# Patient Record
Sex: FEMALE | Race: WHITE | NOT HISPANIC OR LATINO | Employment: FULL TIME | URBAN - METROPOLITAN AREA
[De-identification: names, ages, dates, MRNs, and addresses within clinical notes are randomized per-mention and may not be internally consistent; named-entity substitution may affect disease eponyms.]

---

## 2017-12-08 ENCOUNTER — HOSPITAL ENCOUNTER (OUTPATIENT)
Dept: FAMILY MEDICINE CLINIC | Facility: CLINIC | Age: 52
Setting detail: SPECIMEN
Discharge: HOME | End: 2017-12-08
Attending: FAMILY MEDICINE | Admitting: FAMILY MEDICINE

## 2017-12-08 LAB
ALBUMIN SERPL-MCNC: 4.2 G/DL (ref 3.5–4.8)
ALBUMIN/GLOB SERPL: 1.4 {RATIO} (ref 1–1.7)
ALP SERPL-CCNC: 47 IU/L (ref 32–91)
ALT SERPL-CCNC: 41 IU/L (ref 14–54)
ANION GAP SERPL CALC-SCNC: 12.7 MMOL/L (ref 10–20)
AST SERPL-CCNC: 32 IU/L (ref 15–41)
BILIRUB SERPL-MCNC: 0.3 MG/DL (ref 0.3–1.2)
BUN SERPL-MCNC: 13 MG/DL (ref 8–20)
BUN/CREAT SERPL: 18.6 (ref 5.4–26.2)
CALCIUM SERPL-MCNC: 9.6 MG/DL (ref 8.9–10.3)
CHLORIDE SERPL-SCNC: 104 MMOL/L (ref 101–111)
CHOLEST SERPL-MCNC: 182 MG/DL
CHOLEST/HDLC SERPL: 4.1 {RATIO}
CONV CO2: 27 MMOL/L (ref 22–32)
CONV LDL CHOLESTEROL DIRECT: 114 MG/DL (ref 0–100)
CONV TOTAL PROTEIN: 7.3 G/DL (ref 6.1–7.9)
CREAT UR-MCNC: 0.7 MG/DL (ref 0.4–1)
GLOBULIN UR ELPH-MCNC: 3.1 G/DL (ref 2.5–3.8)
GLUCOSE SERPL-MCNC: 94 MG/DL (ref 65–99)
HDLC SERPL-MCNC: 45 MG/DL
LDLC/HDLC SERPL: 2.6 {RATIO}
LIPID INTERPRETATION: ABNORMAL
POTASSIUM SERPL-SCNC: 4.7 MMOL/L (ref 3.6–5.1)
SODIUM SERPL-SCNC: 139 MMOL/L (ref 136–144)
TRIGL SERPL-MCNC: 207 MG/DL
TSH SERPL-ACNC: 0.74 UIU/ML (ref 0.34–5.6)
VLDLC SERPL CALC-MCNC: 23.5 MG/DL

## 2017-12-26 ENCOUNTER — HOSPITAL ENCOUNTER (OUTPATIENT)
Dept: FAMILY MEDICINE CLINIC | Facility: CLINIC | Age: 52
Setting detail: SPECIMEN
Discharge: HOME | End: 2017-12-26
Attending: FAMILY MEDICINE | Admitting: FAMILY MEDICINE

## 2019-12-29 RX ORDER — LEVOTHYROXINE SODIUM 0.1 MG/1
TABLET ORAL
Qty: 90 TABLET | Refills: 0 | Status: SHIPPED | OUTPATIENT
Start: 2019-12-29 | End: 2020-03-29

## 2019-12-29 RX ORDER — LOVASTATIN 20 MG/1
TABLET ORAL
Qty: 90 TABLET | Refills: 0 | Status: SHIPPED | OUTPATIENT
Start: 2019-12-29 | End: 2020-03-29

## 2020-01-02 ENCOUNTER — TELEPHONE (OUTPATIENT)
Dept: FAMILY MEDICINE CLINIC | Facility: CLINIC | Age: 55
End: 2020-01-02

## 2020-01-02 DIAGNOSIS — E78.5 HYPERLIPIDEMIA, UNSPECIFIED HYPERLIPIDEMIA TYPE: ICD-10-CM

## 2020-01-02 DIAGNOSIS — D50.8 OTHER IRON DEFICIENCY ANEMIA: Primary | ICD-10-CM

## 2020-01-02 DIAGNOSIS — E03.9 HYPOTHYROIDISM, UNSPECIFIED TYPE: ICD-10-CM

## 2020-01-02 NOTE — TELEPHONE ENCOUNTER
PT COMING IN Monday MORNING FOR LABS WITH F/U APPT LATER IN WEEK FOR MED REFILLS.  NEED ORDER PLEASE

## 2020-01-06 LAB
ALBUMIN SERPL-MCNC: 4.4 G/DL (ref 3.5–5.2)
ALBUMIN/GLOB SERPL: 1.4 G/DL
ALP SERPL-CCNC: 58 U/L (ref 39–117)
ALT SERPL W P-5'-P-CCNC: 54 U/L (ref 1–33)
ANION GAP SERPL CALCULATED.3IONS-SCNC: 15.3 MMOL/L (ref 5–15)
AST SERPL-CCNC: 47 U/L (ref 1–32)
BASOPHILS # BLD AUTO: 0.08 10*3/MM3 (ref 0–0.2)
BASOPHILS NFR BLD AUTO: 1.5 % (ref 0–1.5)
BILIRUB SERPL-MCNC: 0.4 MG/DL (ref 0.2–1.2)
BUN BLD-MCNC: 12 MG/DL (ref 6–20)
BUN/CREAT SERPL: 15.8 (ref 7–25)
CALCIUM SPEC-SCNC: 9.1 MG/DL (ref 8.6–10.5)
CHLORIDE SERPL-SCNC: 101 MMOL/L (ref 98–107)
CHOLEST SERPL-MCNC: 200 MG/DL (ref 0–200)
CO2 SERPL-SCNC: 22.7 MMOL/L (ref 22–29)
CREAT BLD-MCNC: 0.76 MG/DL (ref 0.57–1)
DEPRECATED RDW RBC AUTO: 40.3 FL (ref 37–54)
EOSINOPHIL # BLD AUTO: 0.27 10*3/MM3 (ref 0–0.4)
EOSINOPHIL NFR BLD AUTO: 5 % (ref 0.3–6.2)
ERYTHROCYTE [DISTWIDTH] IN BLOOD BY AUTOMATED COUNT: 12.8 % (ref 12.3–15.4)
GFR SERPL CREATININE-BSD FRML MDRD: 80 ML/MIN/1.73
GLOBULIN UR ELPH-MCNC: 3.2 GM/DL
GLUCOSE BLD-MCNC: 128 MG/DL (ref 65–99)
HCT VFR BLD AUTO: 37.4 % (ref 34–46.6)
HDLC SERPL-MCNC: 45 MG/DL (ref 40–60)
HGB BLD-MCNC: 12.5 G/DL (ref 12–15.9)
IMM GRANULOCYTES # BLD AUTO: 0.01 10*3/MM3 (ref 0–0.05)
IMM GRANULOCYTES NFR BLD AUTO: 0.2 % (ref 0–0.5)
LDLC SERPL CALC-MCNC: 109 MG/DL (ref 0–100)
LDLC/HDLC SERPL: 2.43 {RATIO}
LYMPHOCYTES # BLD AUTO: 2.01 10*3/MM3 (ref 0.7–3.1)
LYMPHOCYTES NFR BLD AUTO: 37.4 % (ref 19.6–45.3)
MCH RBC QN AUTO: 29.1 PG (ref 26.6–33)
MCHC RBC AUTO-ENTMCNC: 33.4 G/DL (ref 31.5–35.7)
MCV RBC AUTO: 87 FL (ref 79–97)
MONOCYTES # BLD AUTO: 0.38 10*3/MM3 (ref 0.1–0.9)
MONOCYTES NFR BLD AUTO: 7.1 % (ref 5–12)
NEUTROPHILS # BLD AUTO: 2.62 10*3/MM3 (ref 1.7–7)
NEUTROPHILS NFR BLD AUTO: 48.8 % (ref 42.7–76)
NRBC BLD AUTO-RTO: 0 /100 WBC (ref 0–0.2)
PLATELET # BLD AUTO: 305 10*3/MM3 (ref 140–450)
PMV BLD AUTO: 9.8 FL (ref 6–12)
POTASSIUM BLD-SCNC: 4.4 MMOL/L (ref 3.5–5.2)
PROT SERPL-MCNC: 7.6 G/DL (ref 6–8.5)
RBC # BLD AUTO: 4.3 10*6/MM3 (ref 3.77–5.28)
SODIUM BLD-SCNC: 139 MMOL/L (ref 136–145)
TRIGL SERPL-MCNC: 229 MG/DL (ref 0–150)
TSH SERPL DL<=0.05 MIU/L-ACNC: 3.33 UIU/ML (ref 0.27–4.2)
VLDLC SERPL-MCNC: 45.8 MG/DL (ref 5–40)
WBC NRBC COR # BLD: 5.37 10*3/MM3 (ref 3.4–10.8)

## 2020-01-06 PROCEDURE — 36415 COLL VENOUS BLD VENIPUNCTURE: CPT | Performed by: FAMILY MEDICINE

## 2020-01-06 PROCEDURE — 85025 COMPLETE CBC W/AUTO DIFF WBC: CPT | Performed by: FAMILY MEDICINE

## 2020-01-06 PROCEDURE — 80053 COMPREHEN METABOLIC PANEL: CPT | Performed by: FAMILY MEDICINE

## 2020-01-06 PROCEDURE — 84443 ASSAY THYROID STIM HORMONE: CPT | Performed by: FAMILY MEDICINE

## 2020-01-06 PROCEDURE — 80061 LIPID PANEL: CPT | Performed by: FAMILY MEDICINE

## 2020-01-10 ENCOUNTER — OFFICE VISIT (OUTPATIENT)
Dept: FAMILY MEDICINE CLINIC | Facility: CLINIC | Age: 55
End: 2020-01-10

## 2020-01-10 VITALS
TEMPERATURE: 98 F | WEIGHT: 259 LBS | HEART RATE: 79 BPM | HEIGHT: 67 IN | BODY MASS INDEX: 40.65 KG/M2 | SYSTOLIC BLOOD PRESSURE: 136 MMHG | OXYGEN SATURATION: 98 % | DIASTOLIC BLOOD PRESSURE: 90 MMHG

## 2020-01-10 DIAGNOSIS — E03.9 HYPOTHYROIDISM, UNSPECIFIED TYPE: Primary | ICD-10-CM

## 2020-01-10 DIAGNOSIS — E78.5 HYPERLIPIDEMIA, UNSPECIFIED HYPERLIPIDEMIA TYPE: ICD-10-CM

## 2020-01-10 DIAGNOSIS — R42 DIZZINESS: ICD-10-CM

## 2020-01-10 DIAGNOSIS — R00.2 PALPITATION: ICD-10-CM

## 2020-01-10 DIAGNOSIS — R73.03 PREDIABETES: ICD-10-CM

## 2020-01-10 PROCEDURE — 99214 OFFICE O/P EST MOD 30 MIN: CPT | Performed by: FAMILY MEDICINE

## 2020-01-10 NOTE — PROGRESS NOTES
Subjective   Chief Complaint   Patient presents with   • Med Refill   • Anxiety     thinking something bad is going to happen to her, has had some weird things going on with the her lately     Racheal Kate is a 54 y.o. female.     Anxiety   Presents for initial visit. Onset was 1 to 6 months ago. The problem has been gradually worsening. Symptoms include dizziness, irritability, nausea, nervous/anxious behavior, palpitations and shortness of breath. Patient reports no chest pain or depressed mood. Symptoms occur most days. The severity of symptoms is moderate. The quality of sleep is fair.     There are no known risk factors. Past treatments include nothing.   Hypothyroidism   This is a chronic problem. The current episode started more than 1 year ago. The problem has been unchanged. Associated symptoms include nausea. Pertinent negatives include no abdominal pain, chest pain, chills, coughing, fatigue, fever, rash, sore throat or weakness. Nothing aggravates the symptoms.   Hyperlipidemia   This is a chronic problem. The current episode started more than 1 year ago. Recent lipid tests were reviewed and are variable. Exacerbating diseases include hypothyroidism. There are no known factors aggravating her hyperlipidemia. Associated symptoms include shortness of breath. Pertinent negatives include no chest pain. Current antihyperlipidemic treatment includes statins. There are no compliance problems.       Past Medical History:   Diagnosis Date   • Anemia    • Bacterial vaginosis    • Cervical lymphadenopathy     RIGHT   • Head ache    • Hyperlipidemia    • Hypothyroidism    • Menometrorrhagia    • Menorrhagia      Past Surgical History:   Procedure Laterality Date   •  SECTION       Allergies   Allergen Reactions   • Iodine Unknown - High Severity   • Shellfish Allergy Unknown - High Severity     Social History     Socioeconomic History   • Marital status: Single     Spouse name: Not on file   • Number of  children: Not on file   • Years of education: Not on file   • Highest education level: Not on file   Tobacco Use   • Smoking status: Former Smoker     Years: 20.00   • Smokeless tobacco: Never Used   • Tobacco comment: quit 11 yrs ago   Substance and Sexual Activity   • Alcohol use: Not Currently     Comment: caffeine 1-3c qd     Social History     Tobacco Use   Smoking Status Former Smoker   • Years: 20.00   Smokeless Tobacco Never Used   Tobacco Comment    quit 11 yrs ago       family history includes Allergies in an other family member; COPD in her mother; Cancer in an other family member; Coronary artery disease in an other family member; Diabetes in her mother, sister, and another family member; Heart disease in her father, mother, and another family member.  Current Outpatient Medications on File Prior to Visit   Medication Sig Dispense Refill   • levothyroxine (SYNTHROID, LEVOTHROID) 100 MCG tablet TAKE 1 TABLET BY MOUTH ONCE DAILY 90 tablet 0   • lovastatin (MEVACOR) 20 MG tablet TAKE 1 TABLET BY MOUTH ONCE DAILY AT BEDTIME 90 tablet 0     No current facility-administered medications on file prior to visit.      Patient Active Problem List   Diagnosis   • Anemia   • Family history of diabetes mellitus   • Hyperlipidemia   • Hypothyroidism   • Prediabetes   • Palpitation   • Dizziness       The following portions of the patient's history were reviewed and updated as appropriate: allergies, current medications, past family history, past medical history, past social history, past surgical history and problem list.    Review of Systems   Constitutional: Positive for irritability. Negative for chills, fatigue and fever.   HENT: Negative for sinus pressure and sore throat.    Eyes: Negative for blurred vision.   Respiratory: Positive for shortness of breath. Negative for cough.    Cardiovascular: Positive for palpitations. Negative for chest pain.   Gastrointestinal: Positive for nausea. Negative for abdominal  "pain.   Endocrine: Negative for polyuria.   Musculoskeletal: Positive for back pain.   Skin: Negative for rash.   Neurological: Positive for dizziness and headache. Negative for weakness.   Hematological: Negative for adenopathy.   Psychiatric/Behavioral: Negative for depressed mood. The patient is nervous/anxious.        Objective   /90 (BP Location: Left arm, Patient Position: Sitting, Cuff Size: Large Adult)   Pulse 79   Temp 98 °F (36.7 °C) (Oral)   Ht 170.2 cm (67\")   Wt 117 kg (259 lb)   SpO2 98%   BMI 40.57 kg/m²   Physical Exam   Constitutional: She is oriented to person, place, and time. She appears well-developed. No distress.   HENT:   Head: Normocephalic.   Eyes: Conjunctivae and lids are normal.   Neck: Trachea normal. No thyroid mass and no thyromegaly present.   Cardiovascular: Normal rate, regular rhythm and normal heart sounds.   Pulmonary/Chest: Effort normal and breath sounds normal.   Lymphadenopathy:     She has no cervical adenopathy.   Neurological: She is alert and oriented to person, place, and time.   Skin: Skin is warm and dry.   Psychiatric: She has a normal mood and affect. Her speech is normal and behavior is normal. She is attentive.       No visits with results within 1 Week(s) from this visit.   Latest known visit with results is:   Telephone on 01/02/2020   Component Date Value Ref Range Status   • Glucose 01/06/2020 128* 65 - 99 mg/dL Final   • BUN 01/06/2020 12  6 - 20 mg/dL Final   • Creatinine 01/06/2020 0.76  0.57 - 1.00 mg/dL Final   • Sodium 01/06/2020 139  136 - 145 mmol/L Final   • Potassium 01/06/2020 4.4  3.5 - 5.2 mmol/L Final   • Chloride 01/06/2020 101  98 - 107 mmol/L Final   • CO2 01/06/2020 22.7  22.0 - 29.0 mmol/L Final   • Calcium 01/06/2020 9.1  8.6 - 10.5 mg/dL Final   • Total Protein 01/06/2020 7.6  6.0 - 8.5 g/dL Final   • Albumin 01/06/2020 4.40  3.50 - 5.20 g/dL Final   • ALT (SGPT) 01/06/2020 54* 1 - 33 U/L Final   • AST (SGOT) 01/06/2020 47* 1 " - 32 U/L Final   • Alkaline Phosphatase 01/06/2020 58  39 - 117 U/L Final   • Total Bilirubin 01/06/2020 0.4  0.2 - 1.2 mg/dL Final   • eGFR Non African Amer 01/06/2020 80  >60 mL/min/1.73 Final   • Globulin 01/06/2020 3.2  gm/dL Final   • A/G Ratio 01/06/2020 1.4  g/dL Final   • BUN/Creatinine Ratio 01/06/2020 15.8  7.0 - 25.0 Final   • Anion Gap 01/06/2020 15.3* 5.0 - 15.0 mmol/L Final   • Total Cholesterol 01/06/2020 200  0 - 200 mg/dL Final   • Triglycerides 01/06/2020 229* 0 - 150 mg/dL Final   • HDL Cholesterol 01/06/2020 45  40 - 60 mg/dL Final   • LDL Cholesterol  01/06/2020 109* 0 - 100 mg/dL Final   • VLDL Cholesterol 01/06/2020 45.8* 5 - 40 mg/dL Final   • LDL/HDL Ratio 01/06/2020 2.43   Final   • TSH 01/06/2020 3.330  0.270 - 4.200 uIU/mL Final   • WBC 01/06/2020 5.37  3.40 - 10.80 10*3/mm3 Final   • RBC 01/06/2020 4.30  3.77 - 5.28 10*6/mm3 Final   • Hemoglobin 01/06/2020 12.5  12.0 - 15.9 g/dL Final   • Hematocrit 01/06/2020 37.4  34.0 - 46.6 % Final   • MCV 01/06/2020 87.0  79.0 - 97.0 fL Final   • MCH 01/06/2020 29.1  26.6 - 33.0 pg Final   • MCHC 01/06/2020 33.4  31.5 - 35.7 g/dL Final   • RDW 01/06/2020 12.8  12.3 - 15.4 % Final   • RDW-SD 01/06/2020 40.3  37.0 - 54.0 fl Final   • MPV 01/06/2020 9.8  6.0 - 12.0 fL Final   • Platelets 01/06/2020 305  140 - 450 10*3/mm3 Final   • Neutrophil % 01/06/2020 48.8  42.7 - 76.0 % Final   • Lymphocyte % 01/06/2020 37.4  19.6 - 45.3 % Final   • Monocyte % 01/06/2020 7.1  5.0 - 12.0 % Final   • Eosinophil % 01/06/2020 5.0  0.3 - 6.2 % Final   • Basophil % 01/06/2020 1.5  0.0 - 1.5 % Final   • Immature Grans % 01/06/2020 0.2  0.0 - 0.5 % Final   • Neutrophils, Absolute 01/06/2020 2.62  1.70 - 7.00 10*3/mm3 Final   • Lymphocytes, Absolute 01/06/2020 2.01  0.70 - 3.10 10*3/mm3 Final   • Monocytes, Absolute 01/06/2020 0.38  0.10 - 0.90 10*3/mm3 Final   • Eosinophils, Absolute 01/06/2020 0.27  0.00 - 0.40 10*3/mm3 Final   • Basophils, Absolute 01/06/2020 0.08  0.00  - 0.20 10*3/mm3 Final   • Immature Grans, Absolute 01/06/2020 0.01  0.00 - 0.05 10*3/mm3 Final   • nRBC 01/06/2020 0.0  0.0 - 0.2 /100 WBC Final           Assessment/Plan   Problems Addressed this Visit        Cardiovascular and Mediastinum    Hyperlipidemia    Relevant Orders    Ambulatory Referral to Cardiology    Palpitation    Relevant Orders    Ambulatory Referral to Cardiology       Endocrine    Hypothyroidism - Primary       Other    Prediabetes    Dizziness    Relevant Orders    Ambulatory Referral to Cardiology          Racheal was seen today for med refill and anxiety.    Diagnoses and all orders for this visit:    Hypothyroidism, unspecified type    Prediabetes    Hyperlipidemia, unspecified hyperlipidemia type  -     Ambulatory Referral to Cardiology    Palpitation  -     Ambulatory Referral to Cardiology    Dizziness  -     Ambulatory Referral to Cardiology

## 2020-02-03 ENCOUNTER — OFFICE VISIT (OUTPATIENT)
Dept: CARDIOLOGY | Facility: CLINIC | Age: 55
End: 2020-02-03

## 2020-02-03 VITALS
DIASTOLIC BLOOD PRESSURE: 88 MMHG | SYSTOLIC BLOOD PRESSURE: 137 MMHG | HEIGHT: 67 IN | HEART RATE: 76 BPM | OXYGEN SATURATION: 98 % | WEIGHT: 268 LBS | BODY MASS INDEX: 42.06 KG/M2

## 2020-02-03 DIAGNOSIS — R73.03 PREDIABETES: ICD-10-CM

## 2020-02-03 DIAGNOSIS — E78.5 DYSLIPIDEMIA: ICD-10-CM

## 2020-02-03 DIAGNOSIS — R94.31 ABNORMAL EKG: ICD-10-CM

## 2020-02-03 DIAGNOSIS — Z82.49 FAMILY HISTORY OF PREMATURE CAD: ICD-10-CM

## 2020-02-03 DIAGNOSIS — I20.0 UNSTABLE ANGINA PECTORIS (HCC): Primary | ICD-10-CM

## 2020-02-03 PROCEDURE — 99204 OFFICE O/P NEW MOD 45 MIN: CPT | Performed by: INTERNAL MEDICINE

## 2020-02-03 PROCEDURE — 93000 ELECTROCARDIOGRAM COMPLETE: CPT | Performed by: INTERNAL MEDICINE

## 2020-02-03 RX ORDER — METOPROLOL SUCCINATE 25 MG/1
25 TABLET, EXTENDED RELEASE ORAL DAILY
Qty: 30 TABLET | Refills: 11 | Status: SHIPPED | OUTPATIENT
Start: 2020-02-03 | End: 2021-01-26

## 2020-02-03 NOTE — PROGRESS NOTES
Cardiology Consult Note    Patient Identification:  Name: Racheal Kate  Age: 54 y.o.  Sex: female  :  1965  MRN: [id number]             Requesting Physician : Dr. Kelly Casas    Reason for Consultation / Chief Complaint : patient co-management shortness of breath palpitations    History of Present Illness:      This is a 54-year-old with PMH of    -Dyslipidemia  -Prediabetes, hypothyroidism  -Anemia , anxiety, PCT, BMI over 40  -/Intolerance to iodine and shellfish  -Smoker  -For premature CAD Sister MI PCI 50 to mother  from MI Father had CAD CABG    Here for evaluation of dizziness shortness of breath and palpitations and chest pain.  Patient has intermittent dizziness and heart fluttering.  Has bilateral tightness in the shoulder blades lasted anywhere up to an hour a month ago.  Patient's arterial blood pressure is 137/88, heart rate 76, O2 sat of 98% on room air.  Labs from 2020 revealed normal CMP, CBC, TSH except for glucose of 128  Patient had an EKG done today with moderate ST segment depression nonspecific       Assessment:       -Prolonged new onset chest pain consistent with unstable angina  -Palpitations  -Dyslipidemia,   -positive family history,   -abnormal EKG,   -prediabetes    Recommendations / Plan:      Reviewed EKG results with patient  Counseled on diet exercise weight loss  Start beta-blockers help with palpitations and unstable angina  We will schedule for stress test patient says she cannot walk due to DJD and deconditioning will schedule a Lexiscan Cardiolite, risk benefits alternatives explained.  We will follow-up after testing and consider further evaluation and treatment          Diagnosis Plan   1. Unstable angina pectoris (CMS/HCC)  Stress Test With Myocardial Perfusion One Day   2. Abnormal EKG  Stress Test With Myocardial Perfusion One Day   3. Family history of premature CAD  Stress Test With Myocardial Perfusion One Day   4. Dyslipidemia  Stress Test With  Myocardial Perfusion One Day   5. Prediabetes              Past Medical History:  Past Medical History:   Diagnosis Date   • Anemia    • Bacterial vaginosis    • Cervical lymphadenopathy     RIGHT   • Head ache    • Hyperlipidemia    • Hypothyroidism    • Menometrorrhagia    • Menorrhagia      Past Surgical History:  Past Surgical History:   Procedure Laterality Date   •  SECTION        Allergies:  Allergies   Allergen Reactions   • Iodine Unknown - High Severity   • Shellfish Allergy Unknown - High Severity     Home Meds:  Current Meds:     Current Outpatient Medications:   •  aspirin 81 MG tablet, Take 1 tablet by mouth Daily., Disp: 30 tablet, Rfl: 11  •  levothyroxine (SYNTHROID, LEVOTHROID) 100 MCG tablet, TAKE 1 TABLET BY MOUTH ONCE DAILY, Disp: 90 tablet, Rfl: 0  •  lovastatin (MEVACOR) 20 MG tablet, TAKE 1 TABLET BY MOUTH ONCE DAILY AT BEDTIME, Disp: 90 tablet, Rfl: 0  •  metoprolol succinate XL (TOPROL-XL) 25 MG 24 hr tablet, Take 1 tablet by mouth Daily., Disp: 30 tablet, Rfl: 11  Social History:   Social History     Tobacco Use   • Smoking status: Former Smoker     Packs/day: 0.25     Years: 20.00     Pack years: 5.00     Types: Cigarettes   • Smokeless tobacco: Never Used   • Tobacco comment: quit 11 yrs ago   Substance Use Topics   • Alcohol use: Not Currently     Comment: caffeine 1-3c qd      Family History:  Family History   Problem Relation Age of Onset   • Coronary artery disease Other    • Diabetes Other         GRANDMOTHER   • Heart disease Other    • Allergies Other    • Cancer Other    • Diabetes Mother    • Heart disease Mother    • COPD Mother    • Diabetes Sister    • Heart disease Sister    • Heart disease Father    • Heart attack Father    • Heart attack Paternal Uncle    • Heart attack Paternal Uncle         Review of Systems : Review of Systems   Constitution: Negative for malaise/fatigue, weight gain and weight loss.   HENT: Negative for nosebleeds.    Cardiovascular: Positive  "for chest pain and palpitations. Negative for dyspnea on exertion, leg swelling, near-syncope and syncope.   Respiratory: Positive for shortness of breath (intermittent). Negative for cough and hemoptysis.    Endocrine: Negative for cold intolerance, heat intolerance, polydipsia and polyphagia.   Hematologic/Lymphatic: Does not bruise/bleed easily.   Skin: Negative for rash.   Musculoskeletal: Negative for arthritis and back pain.   Gastrointestinal: Negative for diarrhea, hematochezia, melena, nausea and vomiting.   Genitourinary: Negative for dysuria and hematuria.   Neurological: Positive for dizziness and light-headedness. Negative for numbness and seizures.   Psychiatric/Behavioral: Negative for altered mental status.       Constitutional:       Physical Exam   /88 (BP Location: Left arm, Patient Position: Sitting, Cuff Size: Large Adult)   Pulse 76   Ht 170.2 cm (67\")   Wt 122 kg (268 lb)   SpO2 98%   BMI 41.97 kg/m²   Physical Exam  General:  Appears in no acute distress  Eyes: Sclera is anicteric,  conjunctiva is clear   HEENT:  No JVD. Thyroid not visibly enlarged. No mucosal pallor or cyanosis  Respiratory: Respirations regular and unlabored at rest.  Bilaterally good breath sounds, with good air entry in all fields. No crackles, rubs or wheezes auscultated  Cardiovascular: S1,S2 Regular rate and rhythm. No murmur, rub or gallop auscultated. No pretibial pitting edema  Gastrointestinal: Abdomen soft, flat, non tender. Bowel sounds present.   Musculoskeletal:  No abnormal movements  Extremities: No digital clubbing or cyanosis  Skin: Color pink. Skin warm and dry to touch. No rashes  No xanthoma  Neuro: Alert and awake, no lateralizing deficits appreciated    Cardiographics  ECG: EKG tracing was  personally reviewed by me    ECG 12 Lead  Date/Time: 2/3/2020 1:31 PM  Performed by: Vipin Dumont MD  Authorized by: Vipin Dumont MD   Comparison: not compared with previous ECG "   Previous ECG: no previous ECG available  Comments: EKG done today reviewed by me shows sinus rhythm at the rate of 69 bpm with moderate ST segment depression, no comparison EKG available                   Imaging  Chest X-ray:   Imaging Results (Last 24 Hours)     ** No results found for the last 24 hours. **          Lab Review: I have reviewed the labs                                    Vipin Dumont MD  2/9/2020, 1:32 PM      EMR Dragon/Transcription:   Dictated utilizing Dragon dictation

## 2020-02-19 ENCOUNTER — TELEPHONE (OUTPATIENT)
Dept: CARDIOLOGY | Facility: HOSPITAL | Age: 55
End: 2020-02-19

## 2020-02-19 NOTE — TELEPHONE ENCOUNTER
PT WAS SCHEDULED FOR STRESS TEST Friday, HOWEVER SHE JUST CALLED TO CANCEL. SAID COLLECTIONS OFFICE CALLED HER AND INFORMED HER SHE WOULD HAVE TO PAY X AMOUNT UPFRONT TO HAVE THE TEST DONE. SHE EXPLAINED HER DEDUCTIBLE WAS HIGH SO HER OUT OF POCKET EXPENSE THEY ARE ASKING IS TO MUCH, PT PLANS TO SWITCH INSURANCE WHEN HER EMPLOYER HAS OPEN ENROLLMENT AND WILL TRY TO RESCHEDULE THEN IF IT MAKES UPFRONT PRICING CHEAPER

## 2020-02-21 ENCOUNTER — APPOINTMENT (OUTPATIENT)
Dept: CARDIOLOGY | Facility: HOSPITAL | Age: 55
End: 2020-02-21

## 2020-03-25 ENCOUNTER — TELEPHONE (OUTPATIENT)
Dept: FAMILY MEDICINE CLINIC | Facility: CLINIC | Age: 55
End: 2020-03-25

## 2020-03-25 RX ORDER — SULFAMETHOXAZOLE AND TRIMETHOPRIM 800; 160 MG/1; MG/1
1 TABLET ORAL 2 TIMES DAILY
Qty: 14 TABLET | Refills: 0 | Status: SHIPPED | OUTPATIENT
Start: 2020-03-25 | End: 2020-05-18

## 2020-03-29 RX ORDER — LOVASTATIN 20 MG/1
TABLET ORAL
Qty: 90 TABLET | Refills: 0 | Status: SHIPPED | OUTPATIENT
Start: 2020-03-29 | End: 2020-06-29 | Stop reason: SDUPTHER

## 2020-03-29 RX ORDER — LEVOTHYROXINE SODIUM 0.1 MG/1
TABLET ORAL
Qty: 90 TABLET | Refills: 0 | Status: SHIPPED | OUTPATIENT
Start: 2020-03-29 | End: 2020-06-29 | Stop reason: SDUPTHER

## 2020-05-18 ENCOUNTER — OFFICE VISIT (OUTPATIENT)
Dept: FAMILY MEDICINE CLINIC | Facility: CLINIC | Age: 55
End: 2020-05-18

## 2020-05-18 VITALS
SYSTOLIC BLOOD PRESSURE: 162 MMHG | BODY MASS INDEX: 39.78 KG/M2 | TEMPERATURE: 97.1 F | DIASTOLIC BLOOD PRESSURE: 94 MMHG | HEART RATE: 71 BPM | OXYGEN SATURATION: 98 % | WEIGHT: 254 LBS
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
## 2020-05-18 DIAGNOSIS — L03.311 CELLULITIS, ABDOMINAL WALL: Primary | ICD-10-CM

## 2020-05-18 DIAGNOSIS — S30.861A TICK BITE OF ABDOMINAL WALL, INITIAL ENCOUNTER: ICD-10-CM

## 2020-05-18 PROCEDURE — 99213 OFFICE O/P EST LOW 20 MIN: CPT | Performed by: FAMILY MEDICINE

## 2020-05-18 RX ORDER — DOXYCYCLINE 100 MG/1
100 CAPSULE ORAL 2 TIMES DAILY
Qty: 20 CAPSULE | Refills: 0 | Status: SHIPPED | OUTPATIENT
Start: 2020-05-18 | End: 2020-06-15

## 2020-05-18 NOTE — PATIENT INSTRUCTIONS
Tick Bite Information, Adult    Ticks are insects that can bite. Most ticks live in shrubs and grassy areas. They climb onto people and animals that go by. Then they bite. Some ticks carry germs that can make you sick.  How can I prevent tick bites?  · Use an insect repellent that has 20% or higher of the ingredients DEET, picaridin, or [id number]. Put this insect repellent on:  ? Bare skin.  ? The tops of your boots.  ? Your pant legs.  ? The ends of your sleeves.  · If you use an insect repellent that has the ingredient permethrin, make sure to follow the instructions on the bottle. Treat the following:  ? Clothing.  ? Supplies.  ? Boots.  ? Tents.  · Wear long sleeves, long pants, and light colors.  · Tuck your pant legs into your socks.  · Stay in the middle of the trail.  · Try not to walk through long grass.  · Before going inside your house, check your clothes, hair, and skin for ticks. Make sure to check your head, neck, armpits, waist, groin, and joint areas.  · Check for ticks every day.  · When you come indoors:  ? Wash your clothes right away.  ? Shower right away.  ? Dry your clothes in a dryer on high heat for 60 minutes or more.  What is the right way to remove a tick?  Remove a tick from your skin as soon as possible.  · To remove a tick that is crawling on your skin:  ? Go outdoors and brush the tick off.  ? Use tape or a lint roller.  · To remove a tick that is biting:  ? Wash your hands.  ? If you have latex gloves, put them on.  ? Use tweezers, curved forceps, or a tick-removal tool to grasp the tick. Grasp the tick as close to your skin and as close to the tick's head as possible.  ? Gently pull up until the tick lets go.  § Try to keep the tick's head attached to its body.  § Do not twist or jerk the tick.  § Do not squeeze or crush the tick.  Do not try to remove a tick with heat, alcohol, petroleum jelly, or fingernail polish.  How should I get rid of a tick?  Here are some ways to get rid of a  tick that is alive:  · Place the tick in rubbing alcohol.  · Place the tick in a bag or container you can close tightly.  · Wrap the tick tightly in tape.  · Flush the tick down the toilet.  Contact a doctor if:  · You have symptoms of a disease, such as:  ? Pain in a muscle, joint, or bone.  ? Trouble walking or moving your legs.  ? Numbness in your legs.  ? Inability to move (paralysis).  ? A red rash that makes a [place] (bull's-eye rash).  ? Redness and swelling where the tick bit you.  ? A fever.  ? Throwing up (vomiting) over and over.  ? Diarrhea.  ? Weight loss.  ? Tender and swollen lymph glands.  ? Shortness of breath.  ? Cough.  ? Belly pain (abdominal pain).  ? Headache.  ? Being more tired than normal.  ? A change in how alert (conscious) you are.  ? Confusion.  Get help right away if:  · You cannot remove a tick.  · A part of a tick breaks off and gets stuck in your skin.  · You are feeling worse.  Summary  · Ticks may carry germs that can make you sick.  · To prevent tick bites, wear long sleeves, long pants, and light colors. Use insect repellent. Follow the instructions on the bottle.  · If the tick is biting, do not try to remove it with heat, alcohol, petroleum jelly, or fingernail polish.  · Use tweezers, curved forceps, or a tick-removal tool to grasp the tick. Gently pull up until the tick lets go. Do not twist or jerk the tick. Do not squeeze or crush the tick.  · If you have symptoms, contact a doctor.  This information is not intended to replace advice given to you by your health care provider. Make sure you discuss any questions you have with your health care provider.  Document Released: 03/14/2011 Document Revised: 12/03/2019 Document Reviewed: 03/30/2018  [employer] Interactive Patient Education © 2020 [employer]

## 2020-05-18 NOTE — PROGRESS NOTES
Subjective   Chief Complaint   Patient presents with   • Insect Bite     tick bite yesterday     Racheal Kate is a 54 y.o. female.     Insect Bite   This is a new problem. The current episode started yesterday. The problem occurs constantly. The problem has been gradually worsening. Associated symptoms include a rash. Pertinent negatives include no abdominal pain, arthralgias, chest pain, chills, coughing, fever, headaches, myalgias, sore throat or swollen glands. Nothing aggravates the symptoms. She has tried nothing for the symptoms.      Past Medical History:   Diagnosis Date   • Anemia    • Bacterial vaginosis    • Cervical lymphadenopathy     RIGHT   • Head ache    • Hyperlipidemia    • Hypothyroidism    • Menometrorrhagia    • Menorrhagia      Past Surgical History:   Procedure Laterality Date   •  SECTION       Allergies   Allergen Reactions   • Iodine Unknown - High Severity   • Shellfish Allergy Unknown - High Severity     Social History     Socioeconomic History   • Marital status: Single     Spouse name: Not on file   • Number of children: Not on file   • Years of education: Not on file   • Highest education level: Not on file   Tobacco Use   • Smoking status: Former Smoker     Packs/day: 0.25     Years: 20.00     Pack years: 5.00     Types: Cigarettes   • Smokeless tobacco: Never Used   • Tobacco comment: quit 11 yrs ago   Substance and Sexual Activity   • Alcohol use: Not Currently     Comment: caffeine 1-3c qd     Social History     Tobacco Use   Smoking Status Former Smoker   • Packs/day: 0.25   • Years: 20.00   • Pack years: 5.00   • Types: Cigarettes   Smokeless Tobacco Never Used   Tobacco Comment    quit 11 yrs ago       family history includes Allergies in an other family member; COPD in her mother; Cancer in an other family member; Coronary artery disease in an other family member; Diabetes in her mother, sister, and another family member; Heart attack in her father, paternal uncle, and  paternal uncle; Heart disease in her father, mother, sister, and another family member.  Current Outpatient Medications on File Prior to Visit   Medication Sig Dispense Refill   • aspirin 81 MG tablet Take 1 tablet by mouth Daily. 30 tablet 11   • levothyroxine (SYNTHROID, LEVOTHROID) 100 MCG tablet Take 1 tablet by mouth once daily 90 tablet 0   • lovastatin (MEVACOR) 20 MG tablet TAKE 1 TABLET BY MOUTH ONCE DAILY AT BEDTIME 90 tablet 0   • metoprolol succinate XL (TOPROL-XL) 25 MG 24 hr tablet Take 1 tablet by mouth Daily. 30 tablet 11   • [DISCONTINUED] sulfamethoxazole-trimethoprim (Bactrim DS) 800-160 MG per tablet Take 1 tablet by mouth 2 (Two) Times a Day. 14 tablet 0     No current facility-administered medications on file prior to visit.      Patient Active Problem List   Diagnosis   • Anemia   • Family history of diabetes mellitus   • Hyperlipidemia   • Hypothyroidism   • Prediabetes   • Palpitation   • Dizziness   • Tick bite of abdominal wall   • Cellulitis, abdominal wall       The following portions of the patient's history were reviewed and updated as appropriate: allergies, current medications, past family history, past medical history, past social history, past surgical history and problem list.    Review of Systems   Constitutional: Negative for chills and fever.   HENT: Negative for sinus pressure, sore throat and swollen glands.    Eyes: Negative for blurred vision.   Respiratory: Negative for cough and shortness of breath.    Cardiovascular: Negative for chest pain and palpitations.   Gastrointestinal: Negative for abdominal pain.   Endocrine: Negative for polyuria.   Musculoskeletal: Negative for arthralgias and myalgias.   Skin: Positive for rash.   Neurological: Negative for dizziness and headache.   Hematological: Negative for adenopathy.   Psychiatric/Behavioral: Negative for depressed mood.       Objective   /94 (BP Location: Left arm, Patient Position: Sitting, Cuff Size: Adult)    Pulse 71   Temp 97.1 °F (36.2 °C) Comment: contactless  Wt 115 kg (254 lb)   SpO2 98%   BMI 39.78 kg/m²   Physical Exam   Constitutional: She is oriented to person, place, and time. She appears well-developed. No distress.   HENT:   Head: Normocephalic.   Eyes: Conjunctivae and lids are normal.   Neck: Trachea normal. No thyroid mass and no thyromegaly present.   Cardiovascular: Normal rate, regular rhythm and normal heart sounds.   Pulmonary/Chest: Effort normal and breath sounds normal.   Lymphadenopathy:     She has no cervical adenopathy.   Neurological: She is alert and oriented to person, place, and time.   Skin: Skin is warm and dry.   Red and white target lesion right lower abdomen, approx 3 cm    Psychiatric: She has a normal mood and affect. Her speech is normal and behavior is normal. She is attentive.       No visits with results within 1 Week(s) from this visit.   Latest known visit with results is:   Telephone on 01/02/2020   Component Date Value Ref Range Status   • Glucose 01/06/2020 128* 65 - 99 mg/dL Final   • BUN 01/06/2020 12  6 - 20 mg/dL Final   • Creatinine 01/06/2020 0.76  0.57 - 1.00 mg/dL Final   • Sodium 01/06/2020 139  136 - 145 mmol/L Final   • Potassium 01/06/2020 4.4  3.5 - 5.2 mmol/L Final   • Chloride 01/06/2020 101  98 - 107 mmol/L Final   • CO2 01/06/2020 22.7  22.0 - 29.0 mmol/L Final   • Calcium 01/06/2020 9.1  8.6 - 10.5 mg/dL Final   • Total Protein 01/06/2020 7.6  6.0 - 8.5 g/dL Final   • Albumin 01/06/2020 4.40  3.50 - 5.20 g/dL Final   • ALT (SGPT) 01/06/2020 54* 1 - 33 U/L Final   • AST (SGOT) 01/06/2020 47* 1 - 32 U/L Final   • Alkaline Phosphatase 01/06/2020 58  39 - 117 U/L Final   • Total Bilirubin 01/06/2020 0.4  0.2 - 1.2 mg/dL Final   • eGFR Non African Amer 01/06/2020 80  >60 mL/min/1.73 Final   • Globulin 01/06/2020 3.2  gm/dL Final   • A/G Ratio 01/06/2020 1.4  g/dL Final   • BUN/Creatinine Ratio 01/06/2020 15.8  7.0 - 25.0 Final   • Anion Gap 01/06/2020 15.3*  5.0 - 15.0 mmol/L Final   • Total Cholesterol 01/06/2020 200  0 - 200 mg/dL Final   • Triglycerides 01/06/2020 229* 0 - 150 mg/dL Final   • HDL Cholesterol 01/06/2020 45  40 - 60 mg/dL Final   • LDL Cholesterol  01/06/2020 109* 0 - 100 mg/dL Final   • VLDL Cholesterol 01/06/2020 45.8* 5 - 40 mg/dL Final   • LDL/HDL Ratio 01/06/2020 2.43   Final   • TSH 01/06/2020 3.330  0.270 - 4.200 uIU/mL Final   • WBC 01/06/2020 5.37  3.40 - 10.80 10*3/mm3 Final   • RBC 01/06/2020 4.30  3.77 - 5.28 10*6/mm3 Final   • Hemoglobin 01/06/2020 12.5  12.0 - 15.9 g/dL Final   • Hematocrit 01/06/2020 37.4  34.0 - 46.6 % Final   • MCV 01/06/2020 87.0  79.0 - 97.0 fL Final   • MCH 01/06/2020 29.1  26.6 - 33.0 pg Final   • MCHC 01/06/2020 33.4  31.5 - 35.7 g/dL Final   • RDW 01/06/2020 12.8  12.3 - 15.4 % Final   • RDW-SD 01/06/2020 40.3  37.0 - 54.0 fl Final   • MPV 01/06/2020 9.8  6.0 - 12.0 fL Final   • Platelets 01/06/2020 305  140 - 450 10*3/mm3 Final   • Neutrophil % 01/06/2020 48.8  42.7 - 76.0 % Final   • Lymphocyte % 01/06/2020 37.4  19.6 - 45.3 % Final   • Monocyte % 01/06/2020 7.1  5.0 - 12.0 % Final   • Eosinophil % 01/06/2020 5.0  0.3 - 6.2 % Final   • Basophil % 01/06/2020 1.5  0.0 - 1.5 % Final   • Immature Grans % 01/06/2020 0.2  0.0 - 0.5 % Final   • Neutrophils, Absolute 01/06/2020 2.62  1.70 - 7.00 10*3/mm3 Final   • Lymphocytes, Absolute 01/06/2020 2.01  0.70 - 3.10 10*3/mm3 Final   • Monocytes, Absolute 01/06/2020 0.38  0.10 - 0.90 10*3/mm3 Final   • Eosinophils, Absolute 01/06/2020 0.27  0.00 - 0.40 10*3/mm3 Final   • Basophils, Absolute 01/06/2020 0.08  0.00 - 0.20 10*3/mm3 Final   • Immature Grans, Absolute 01/06/2020 0.01  0.00 - 0.05 10*3/mm3 Final   • nRBC 01/06/2020 0.0  0.0 - 0.2 /100 WBC Final           Assessment/Plan   Problems Addressed this Visit        Musculoskeletal and Integument    Tick bite of abdominal wall    Relevant Medications    doxycycline (MONODOX) 100 MG capsule       Other    Cellulitis,  "abdominal wall - Primary    Relevant Medications    doxycycline (MONODOX) 100 MG capsule                 Answers for HPI/ROS submitted by the patient on 5/18/2020   What is the primary reason for your visit?: Other  Please describe your symptoms.: I have a \"ring\" around a recent tick bite.  Have you had these symptoms before?: No  How long have you been having these symptoms?: 1-4 days  Please describe any probable cause for these symptoms. : Tick bite    "

## 2020-05-20 ENCOUNTER — TELEPHONE (OUTPATIENT)
Dept: FAMILY MEDICINE CLINIC | Facility: CLINIC | Age: 55
End: 2020-05-20

## 2020-06-15 PROCEDURE — U0003 INFECTIOUS AGENT DETECTION BY NUCLEIC ACID (DNA OR RNA); SEVERE ACUTE RESPIRATORY SYNDROME CORONAVIRUS 2 (SARS-COV-2) (CORONAVIRUS DISEASE [COVID-19]), AMPLIFIED PROBE TECHNIQUE, MAKING USE OF HIGH THROUGHPUT TECHNOLOGIES AS DESCRIBED BY CMS-2020-01-R: HCPCS | Performed by: FAMILY MEDICINE

## 2020-06-26 RX ORDER — LOVASTATIN 20 MG/1
TABLET ORAL
Qty: 90 TABLET | Refills: 0 | OUTPATIENT
Start: 2020-06-26

## 2020-06-26 RX ORDER — LEVOTHYROXINE SODIUM 0.1 MG/1
TABLET ORAL
Qty: 90 TABLET | Refills: 0 | OUTPATIENT
Start: 2020-06-26

## 2020-06-29 RX ORDER — LOVASTATIN 20 MG/1
20 TABLET ORAL
Qty: 90 TABLET | Refills: 2 | Status: SHIPPED | OUTPATIENT
Start: 2020-06-29 | End: 2021-03-29

## 2020-06-29 RX ORDER — LEVOTHYROXINE SODIUM 0.1 MG/1
100 TABLET ORAL DAILY
Qty: 90 TABLET | Refills: 2 | Status: SHIPPED | OUTPATIENT
Start: 2020-06-29 | End: 2021-03-29

## 2020-07-06 ENCOUNTER — OFFICE VISIT (OUTPATIENT)
Dept: FAMILY MEDICINE CLINIC | Facility: CLINIC | Age: 55
End: 2020-07-06

## 2020-07-06 VITALS
OXYGEN SATURATION: 98 % | WEIGHT: 256 LBS | TEMPERATURE: 98 F | BODY MASS INDEX: 38.92 KG/M2 | HEART RATE: 71 BPM | SYSTOLIC BLOOD PRESSURE: 178 MMHG | DIASTOLIC BLOOD PRESSURE: 108 MMHG

## 2020-07-06 DIAGNOSIS — M54.31 SCIATICA OF RIGHT SIDE: Primary | ICD-10-CM

## 2020-07-06 PROCEDURE — 99213 OFFICE O/P EST LOW 20 MIN: CPT | Performed by: FAMILY MEDICINE

## 2020-07-06 RX ORDER — CYCLOBENZAPRINE HCL 10 MG
10 TABLET ORAL 3 TIMES DAILY PRN
Qty: 20 TABLET | Refills: 0 | Status: SHIPPED | OUTPATIENT
Start: 2020-07-06 | End: 2020-07-17

## 2020-07-06 RX ORDER — IBUPROFEN 200 MG
800 TABLET ORAL EVERY 6 HOURS PRN
COMMUNITY
End: 2021-04-07

## 2020-07-06 RX ORDER — ACETAMINOPHEN 500 MG
1000 TABLET ORAL EVERY 6 HOURS PRN
COMMUNITY
End: 2021-04-07

## 2020-07-06 RX ORDER — METHYLPREDNISOLONE 4 MG/1
TABLET ORAL
Qty: 1 EACH | Refills: 0 | Status: SHIPPED | OUTPATIENT
Start: 2020-07-06 | End: 2021-04-07

## 2020-07-06 NOTE — PATIENT INSTRUCTIONS
Sciatica    Sciatica is pain, weakness, tingling, or loss of feeling (numbness) along the sciatic nerve. The sciatic nerve starts in the lower back and goes down the back of each leg. Sciatica usually goes away on its own or with treatment. Sometimes, sciatica may come back (recur).  What are the causes?  This condition happens when the sciatic nerve is pinched or has pressure put on it. This may be the result of:  · A disk in between the bones of the spine bulging out too far (herniated disk).  · Changes in the spinal disks that occur with aging.  · A condition that affects a muscle in the butt.  · Extra bone growth near the sciatic nerve.  · A break (fracture) of the area between your hip bones (pelvis).  · Pregnancy.  · Tumor. This is rare.  What increases the risk?  You are more likely to develop this condition if you:  · Play sports that put pressure or stress on the spine.  · Have poor strength and ease of movement (flexibility).  · Have had a back injury in the past.  · Have had back surgery.  · Sit for long periods of time.  · Do activities that involve bending or lifting over and over again.  · Are very overweight (obese).  What are the signs or symptoms?  Symptoms can vary from mild to very bad. They may include:  · Any of these problems in the lower back, leg, hip, or butt:  ? Mild tingling, loss of feeling, or dull aches.  ? Burning sensations.  ? Sharp pains.  · Loss of feeling in the back of the calf or the sole of the foot.  · Leg weakness.  · Very bad back pain that makes it hard to move.  These symptoms may get worse when you cough, sneeze, or laugh. They may also get worse when you sit or stand for long periods of time.  How is this treated?  This condition often gets better without any treatment. However, treatment may include:  · Changing or cutting back on physical activity when you have pain.  · Doing exercises and stretching.  · Putting ice or heat on the affected area.  · Medicines that  help:  ? To relieve pain and swelling.  ? To relax your muscles.  · Shots (injections) of medicines that help to relieve pain, irritation, and swelling.  · Surgery.  Follow these instructions at home:  Medicines  · Take over-the-counter and prescription medicines only as told by your doctor.  · Ask your doctor if the medicine prescribed to you:  ? Requires you to avoid driving or using heavy machinery.  ? Can cause trouble pooping (constipation). You may need to take these steps to prevent or treat trouble pooping:  § Drink enough fluids to keep your pee (urine) pale yellow.  § Take over-the-counter or prescription medicines.  § Eat foods that are high in fiber. These include beans, whole grains, and fresh fruits and vegetables.  § Limit foods that are high in fat and sugar. These include fried or sweet foods.  Managing pain         · If told, put ice on the affected area.  ? Put ice in a plastic bag.  ? Place a towel between your skin and the bag.  ? Leave the ice on for 20 minutes, 2-3 times a day.  · If told, put heat on the affected area. Use the heat source that your doctor tells you to use, such as a moist heat pack or a heating pad.  ? Place a towel between your skin and the heat source.  ? Leave the heat on for 20-30 minutes.  ? Remove the heat if your skin turns bright red. This is very important if you are unable to feel pain, heat, or cold. You may have a greater risk of getting burned.  Activity    · Return to your normal activities as told by your doctor. Ask your doctor what activities are safe for you.  · Avoid activities that make your symptoms worse.  · Take short rests during the day.  ? When you rest for a long time, do some physical activity or stretching between periods of rest.  ? Avoid sitting for a long time without moving. Get up and move around at least one time each hour.  · Exercise and stretch regularly, as told by your doctor.  · Do not lift anything that is heavier than 10 lb (4.5 kg)  while you have symptoms of sciatica.  ? Avoid lifting heavy things even when you do not have symptoms.  ? Avoid lifting heavy things over and over.  · When you lift objects, always lift in a way that is safe for your body. To do this, you should:  ? Bend your knees.  ? Keep the object close to your body.  ? Avoid twisting.  General instructions  · Stay at a healthy weight.  · Wear comfortable shoes that support your feet. Avoid wearing high heels.  · Avoid sleeping on a mattress that is too soft or too hard. You might have less pain if you sleep on a mattress that is firm enough to support your back.  · Keep all follow-up visits as told by your doctor. This is important.  Contact a doctor if:  · You have pain that:  ? Wakes you up when you are sleeping.  ? Gets worse when you lie down.  ? Is worse than the pain you have had in the past.  ? Lasts longer than 4 weeks.  · You lose weight without trying.  Get help right away if:  · You cannot control when you pee (urinate) or poop (have a bowel movement).  · You have weakness in any of these areas and it gets worse:  ? Lower back.  ? The area between your hip bones.  ? Butt.  ? Legs.  · You have redness or swelling of your back.  · You have a burning feeling when you pee.  Summary  · Sciatica is pain, weakness, tingling, or loss of feeling (numbness) along the sciatic nerve.  · This condition happens when the sciatic nerve is pinched or has pressure put on it.  · Sciatica can cause pain, tingling, or loss of feeling (numbness) in the lower back, legs, hips, and butt.  · Treatment often includes rest, exercise, medicines, and putting ice or heat on the affected area.  This information is not intended to replace advice given to you by your health care provider. Make sure you discuss any questions you have with your health care provider.  Document Released: 09/26/2009 Document Revised: 01/06/2020 Document Reviewed: 01/06/2020  [employer] Patient Education © 2020 [employer]

## 2020-07-06 NOTE — PROGRESS NOTES
Subjective   Chief Complaint   Patient presents with   • Back Pain     radiates down back of her leg     Racheal Kate is a 54 y.o. female.     Back Pain   This is a new problem. The current episode started in the past 7 days. The problem occurs constantly. The problem has been gradually worsening since onset. The pain is present in the gluteal. The quality of the pain is described as aching and shooting. The pain radiates to the right foot. The pain is moderate. The symptoms are aggravated by sitting and position. Pertinent negatives include no abdominal pain, chest pain, dysuria, fever, headaches, pelvic pain, tingling or weakness. She has tried NSAIDs, analgesics, heat and walking for the symptoms. The treatment provided mild relief.      Past Medical History:   Diagnosis Date   • Anemia    • Bacterial vaginosis    • Cervical lymphadenopathy     RIGHT   • Head ache    • Hyperlipidemia    • Hypothyroidism    • Menometrorrhagia    • Menorrhagia      Past Surgical History:   Procedure Laterality Date   •  SECTION       Allergies   Allergen Reactions   • Iodine Unknown - High Severity   • Shellfish Allergy Unknown - High Severity     Social History     Socioeconomic History   • Marital status: Single     Spouse name: Not on file   • Number of children: Not on file   • Years of education: Not on file   • Highest education level: Not on file   Tobacco Use   • Smoking status: Former Smoker     Packs/day: 0.25     Years: 20.00     Pack years: 5.00     Types: Cigarettes   • Smokeless tobacco: Never Used   • Tobacco comment: quit 11 yrs ago   Substance and Sexual Activity   • Alcohol use: Not Currently     Comment: caffeine 1-3c qd   • Drug use: Defer   • Sexual activity: Defer     Social History     Tobacco Use   Smoking Status Former Smoker   • Packs/day: 0.25   • Years: 20.00   • Pack years: 5.00   • Types: Cigarettes   Smokeless Tobacco Never Used   Tobacco Comment    quit 11 yrs ago       family history  includes Allergies in an other family member; COPD in her mother; Cancer in an other family member; Coronary artery disease in an other family member; Diabetes in her mother, sister, and another family member; Heart attack in her father, paternal uncle, and paternal uncle; Heart disease in her father, mother, sister, and another family member.  Current Outpatient Medications on File Prior to Visit   Medication Sig Dispense Refill   • acetaminophen (TYLENOL) 500 MG tablet Take 1,000 mg by mouth Every 6 (Six) Hours As Needed for Mild Pain .     • ibuprofen (ADVIL,MOTRIN) 200 MG tablet Take 800 mg by mouth Every 6 (Six) Hours As Needed for Mild Pain  or Moderate Pain .     • aspirin 81 MG tablet Take 1 tablet by mouth Daily. 30 tablet 11   • levothyroxine (SYNTHROID, LEVOTHROID) 100 MCG tablet Take 1 tablet by mouth Daily. 90 tablet 2   • lovastatin (MEVACOR) 20 MG tablet Take 1 tablet by mouth every night at bedtime. 90 tablet 2   • metoprolol succinate XL (TOPROL-XL) 25 MG 24 hr tablet Take 1 tablet by mouth Daily. 30 tablet 11     No current facility-administered medications on file prior to visit.      Patient Active Problem List   Diagnosis   • Anemia   • Family history of diabetes mellitus   • Hyperlipidemia   • Hypothyroidism   • Prediabetes   • Palpitation   • Dizziness   • Tick bite of abdominal wall   • Cellulitis, abdominal wall   • Sciatica of right side       The following portions of the patient's history were reviewed and updated as appropriate: allergies, current medications, past family history, past medical history, past social history, past surgical history and problem list.    Review of Systems   Constitutional: Negative for chills and fever.   HENT: Negative for sinus pressure and sore throat.    Eyes: Negative for blurred vision.   Respiratory: Negative for cough and shortness of breath.    Cardiovascular: Negative for chest pain and palpitations.   Gastrointestinal: Negative for abdominal pain.    Endocrine: Negative for polyuria.   Genitourinary: Negative for dysuria and pelvic pain.   Musculoskeletal: Positive for back pain.   Skin: Negative for rash.   Neurological: Negative for dizziness, tingling, weakness and headache.   Hematological: Negative for adenopathy.   Psychiatric/Behavioral: Negative for depressed mood.       Objective   BP (!) 178/108 (BP Location: Left arm, Patient Position: Standing, Cuff Size: Adult)   Pulse 71   Temp 98 °F (36.7 °C)   Wt 116 kg (256 lb)   SpO2 98%   BMI 38.92 kg/m²   Physical Exam   Constitutional: She is oriented to person, place, and time. She appears well-developed. She appears distressed.   HENT:   Head: Normocephalic.   Eyes: Conjunctivae and lids are normal.   Neck: Trachea normal. No thyroid mass and no thyromegaly present.   Cardiovascular: Normal rate, regular rhythm and normal heart sounds.   Pulmonary/Chest: Effort normal and breath sounds normal.   Musculoskeletal:        Lumbar back: She exhibits decreased range of motion and tenderness.   Lymphadenopathy:     She has no cervical adenopathy.   Neurological: She is alert and oriented to person, place, and time.   Skin: Skin is warm and dry.   Psychiatric: She has a normal mood and affect. Her speech is normal and behavior is normal. She is attentive.       No visits with results within 1 Week(s) from this visit.   Latest known visit with results is:   Admission on 06/15/2020, Discharged on 06/15/2020   Component Date Value Ref Range Status   • SARS-CoV-2, ABRAHAN 06/15/2020 Not Detected  Not Detected Final    This test was developed and its performance characteristics determined  by [facility]. This test has not been FDA cleared or  approved. This test has been authorized by FDA under an Emergency Use  Authorization (EUA). This test is only authorized for the duration of  time the declaration that circumstances exist justifying the  authorization of the emergency use of in vitro diagnostic tests  for  detection of SARS-CoV-2 virus and/or diagnosis of COVID-19 infection  under section 564(b)(1) of the Act, 21 U.S.C. 360bbb-3(b)(1), unless  the authorization is terminated or revoked sooner.  When diagnostic testing is negative, the possibility of a false  negative result should be considered in the context of a patient's  recent exposures and the presence of clinical signs and symptoms  consistent with COVID-19. An individual without symptoms of COVID-19  and who is not shedding SARS-CoV-2 virus would expect to have a  negative (not detected) result in this assay.   • COVID [facility] PRIORITY 06/15/2020 Comment   Final    Received           Assessment/Plan   Problems Addressed this Visit        Nervous and Auditory    Sciatica of right side - Primary    Relevant Medications    acetaminophen (TYLENOL) 500 MG tablet    ibuprofen (ADVIL,MOTRIN) 200 MG tablet    methylPREDNISolone (MEDROL, ALIZA,) 4 MG tablet    cyclobenzaprine (FLEXERIL) 10 MG tablet

## 2020-07-10 ENCOUNTER — TELEPHONE (OUTPATIENT)
Dept: FAMILY MEDICINE CLINIC | Facility: CLINIC | Age: 55
End: 2020-07-10

## 2020-07-10 DIAGNOSIS — M54.31 SCIATICA OF RIGHT SIDE: Primary | ICD-10-CM

## 2020-07-10 RX ORDER — TRAMADOL HYDROCHLORIDE 50 MG/1
50 TABLET ORAL EVERY 6 HOURS PRN
Qty: 20 TABLET | Refills: 0 | Status: SHIPPED | OUTPATIENT
Start: 2020-07-10 | End: 2021-04-07

## 2020-07-17 ENCOUNTER — HOSPITAL ENCOUNTER (OUTPATIENT)
Dept: CARDIOLOGY | Facility: HOSPITAL | Age: 55
Discharge: HOME | End: 2020-07-17

## 2020-07-17 ENCOUNTER — TELEPHONE (OUTPATIENT)
Dept: FAMILY MEDICINE CLINIC | Facility: CLINIC | Age: 55
End: 2020-07-17

## 2020-07-17 DIAGNOSIS — M54.31 SCIATICA OF RIGHT SIDE: ICD-10-CM

## 2020-07-17 DIAGNOSIS — Z82.49 FAMILY HISTORY OF PREMATURE CAD: ICD-10-CM

## 2020-07-17 DIAGNOSIS — I20.0 UNSTABLE ANGINA PECTORIS (HCC): ICD-10-CM

## 2020-07-17 DIAGNOSIS — R94.31 ABNORMAL EKG: ICD-10-CM

## 2020-07-17 DIAGNOSIS — E78.5 DYSLIPIDEMIA: ICD-10-CM

## 2020-07-17 LAB
BH CV STRESS COMMENTS STAGE 1: NORMAL
BH CV STRESS DOSE REGADENOSON STAGE 1: 0.4
BH CV STRESS DURATION MIN STAGE 1: 0
BH CV STRESS DURATION SEC STAGE 1: 10
BH CV STRESS PROTOCOL 1: NORMAL
BH CV STRESS RECOVERY BP: NORMAL MMHG
BH CV STRESS RECOVERY HR: 95 BPM
BH CV STRESS STAGE 1: 1
MAXIMAL PREDICTED HEART RATE: 168 BPM
STRESS BASELINE BP: NORMAL MMHG
STRESS BASELINE HR: 78 BPM
STRESS TARGET HR: 143 BPM

## 2020-07-17 PROCEDURE — 78452 HT MUSCLE IMAGE SPECT MULT: CPT | Performed by: INTERNAL MEDICINE

## 2020-07-17 PROCEDURE — 0 TECHNETIUM SESTAMIBI: Performed by: INTERNAL MEDICINE

## 2020-07-17 PROCEDURE — 93018 CV STRESS TEST I&R ONLY: CPT | Performed by: INTERNAL MEDICINE

## 2020-07-17 PROCEDURE — 25010000002 REGADENOSON 0.4 MG/5ML SOLUTION: Performed by: INTERNAL MEDICINE

## 2020-07-17 PROCEDURE — 78452 HT MUSCLE IMAGE SPECT MULT: CPT

## 2020-07-17 PROCEDURE — 93016 CV STRESS TEST SUPVJ ONLY: CPT | Performed by: INTERNAL MEDICINE

## 2020-07-17 PROCEDURE — 93017 CV STRESS TEST TRACING ONLY: CPT

## 2020-07-17 PROCEDURE — A9500 TC99M SESTAMIBI: HCPCS | Performed by: INTERNAL MEDICINE

## 2020-07-17 RX ORDER — CYCLOBENZAPRINE HCL 10 MG
TABLET ORAL
Qty: 20 TABLET | Refills: 0 | Status: SHIPPED | OUTPATIENT
Start: 2020-07-17 | End: 2021-04-07

## 2020-07-17 RX ORDER — GABAPENTIN 300 MG/1
300 CAPSULE ORAL 2 TIMES DAILY PRN
Qty: 30 CAPSULE | Refills: 0 | Status: SHIPPED | OUTPATIENT
Start: 2020-07-17 | End: 2020-07-29 | Stop reason: SDUPTHER

## 2020-07-17 RX ADMIN — REGADENOSON 0.4 MG: 0.08 INJECTION, SOLUTION INTRAVENOUS at 10:45

## 2020-07-17 RX ADMIN — TECHNETIUM TC 99M SESTAMIBI 1 DOSE: 1 INJECTION INTRAVENOUS at 09:15

## 2020-07-17 NOTE — TELEPHONE ENCOUNTER
I am going to send in a Rx for Gabapentin.  If this does not help then she may need an MRI of the area.

## 2020-07-22 ENCOUNTER — TELEPHONE (OUTPATIENT)
Dept: FAMILY MEDICINE CLINIC | Facility: CLINIC | Age: 55
End: 2020-07-22

## 2020-07-22 DIAGNOSIS — M54.31 SCIATICA OF RIGHT SIDE: Primary | ICD-10-CM

## 2020-07-22 NOTE — TELEPHONE ENCOUNTER
Pt c/o sciatic pain not getting any better. the gabapentin only brings the pain down to a 6 or 7, but wears off after a short period of time then the pain is back off the charts. Will you order a MRI or something

## 2020-07-27 ENCOUNTER — HOSPITAL ENCOUNTER (OUTPATIENT)
Dept: MRI IMAGING | Facility: HOSPITAL | Age: 55
Discharge: HOME | End: 2020-07-27
Admitting: FAMILY MEDICINE

## 2020-07-27 DIAGNOSIS — M54.31 SCIATICA OF RIGHT SIDE: ICD-10-CM

## 2020-07-27 PROCEDURE — 72148 MRI LUMBAR SPINE W/O DYE: CPT

## 2020-07-28 ENCOUNTER — PATIENT MESSAGE (OUTPATIENT)
Dept: FAMILY MEDICINE CLINIC | Facility: CLINIC | Age: 55
End: 2020-07-28

## 2020-07-28 DIAGNOSIS — M51.16 LUMBAR DISC DISEASE WITH RADICULOPATHY: Primary | ICD-10-CM

## 2020-07-29 RX ORDER — GABAPENTIN 300 MG/1
300 CAPSULE ORAL 2 TIMES DAILY PRN
Qty: 60 CAPSULE | Refills: 0 | Status: SHIPPED | OUTPATIENT
Start: 2020-07-29 | End: 2020-08-28

## 2020-08-06 ENCOUNTER — TELEPHONE (OUTPATIENT)
Dept: CARDIOLOGY | Facility: CLINIC | Age: 55
End: 2020-08-06

## 2020-08-06 NOTE — TELEPHONE ENCOUNTER
Stress test is normal.  Make an appointment if patient is still having symptoms of chest pain or dyspnea on exertion or palpitations

## 2020-08-06 NOTE — TELEPHONE ENCOUNTER
Pt called asking for results from stress test, wants call back today .    In chart, ordered in Feb, pt had it done 7/17/20 due to Covid outbreak

## 2020-08-18 ENCOUNTER — OFFICE VISIT (OUTPATIENT)
Dept: NEUROSURGERY | Facility: CLINIC | Age: 55
End: 2020-08-18

## 2020-08-18 VITALS
HEART RATE: 86 BPM | BODY MASS INDEX: 38.49 KG/M2 | WEIGHT: 254 LBS | DIASTOLIC BLOOD PRESSURE: 98 MMHG | HEIGHT: 68 IN | SYSTOLIC BLOOD PRESSURE: 144 MMHG

## 2020-08-18 DIAGNOSIS — M51.16 LUMBAR DISC DISEASE WITH RADICULOPATHY: Primary | ICD-10-CM

## 2020-08-18 PROCEDURE — 99204 OFFICE O/P NEW MOD 45 MIN: CPT | Performed by: NEUROLOGICAL SURGERY

## 2020-08-18 RX ORDER — MELOXICAM 15 MG/1
15 TABLET ORAL DAILY
Qty: 60 TABLET | Refills: 2 | Status: SHIPPED | OUTPATIENT
Start: 2020-08-18 | End: 2021-04-07 | Stop reason: SDUPTHER

## 2020-08-18 NOTE — PROGRESS NOTES
"Subjective   Racheal Kate is a 54 y.o. female.     Chief Complaint   Patient presents with   • Back Pain     lumbar radiculopathy     Visit Vitals  /98 (BP Location: Left arm, Patient Position: Sitting, Cuff Size: Large Adult)   Pulse 86   Ht 172.7 cm (68\")   Wt 115 kg (254 lb)   BMI 38.62 kg/m²       History of Present Illness: Mrs Kate is a 54-year-old lady who in July of this year was lifting a picnic table when she felt a pop in her back.  She subsequent started developing pain down the right leg.  This pain started to progressively worsen.  She went to her primary care physician for which she was administered anti-inflammatory via Dosepak and muscle relaxer.  She states this did not help.  She was subsequently placed on Neurontin at 300 mg twice a day which she states did help by taking the pain from 10 out of 10 to a 6 out of 10.  Is aggravated with any standing or twisting.  She does not feel weak but does feel that her leg will give when the pain shoots down her leg.  She denies any urinary urgency or incontinence.  She is not undergone any physical therapy or interventional pain management.  An MRI of the lumbar spine was performed demonstrating a large herniated disc at the L5-S1 level.  She is here today for evaluation.    The following portions of the patient's history were reviewed and updated as appropriate: allergies, current medications, past family history, past medical history, past social history, past surgical history and problem list.    Review of Systems   All other systems reviewed and are negative.           Past Surgical History:   Procedure Laterality Date   •  SECTION         Past Medical History:   Diagnosis Date   • Anemia    • Bacterial vaginosis    • Cervical lymphadenopathy     RIGHT   • Head ache    • Hyperlipidemia    • Hypothyroidism    • Menometrorrhagia    • Menorrhagia      Social History     Socioeconomic History   • Marital status: Single     Spouse name: Not on " file   • Number of children: Not on file   • Years of education: Not on file   • Highest education level: Not on file   Tobacco Use   • Smoking status: Former Smoker     Packs/day: 0.25     Years: 20.00     Pack years: 5.00     Types: Cigarettes   • Smokeless tobacco: Never Used   • Tobacco comment: quit 11 yrs ago   Substance and Sexual Activity   • Alcohol use: Not Currently     Comment: caffeine 1-3c qd   • Drug use: Defer   • Sexual activity: Defer      Family History   Problem Relation Age of Onset   • Coronary artery disease Other    • Diabetes Other         GRANDMOTHER   • Heart disease Other    • Allergies Other    • Cancer Other    • Diabetes Mother    • Heart disease Mother    • COPD Mother    • Diabetes Sister    • Heart disease Sister    • Heart disease Father    • Heart attack Father    • Heart attack Paternal Uncle    • Heart attack Paternal Uncle           Objective   Physical Exam  Neurologic Exam  Ortho Exam  Mildly obese, no apparent distress, pleasant   alert and oriented by 3  Speech is intact and coherent articulate with good content and production  Cranial nerves III through XII are grossly intact with pupils symmetric and reactive and no gaze paresis or nystagmus  Sensation is intact to soft touch and pinprick  in both upper and lower extremities  Positive straight leg lift right  Motor strength is 5/5 in both upper and lower extremities with no focal motor deficits  Reflexes are 1+ in both upper and lower extremities with no upper motor neuron signs  Gait is nonantalgic  Heel toe walking is intact  No dysmetria or dysdiadochokinesia  Full lumbar range of motion with exacerbation pain right lateral extension  Extremities are normal symmetrical 2+ distal pulses and less than 2-second cap refill    Examination: MRI LUMBAR SPINE WO CONTRAST-     Date of Exam: 7/27/2020 11:04 AM     Indication: Low back pain, >6wks conservative tx, persistent-progressive  sx, surgical candidate; M54.31-Sciatica,  right side.     Comparison: None available.     Technique: Standard noncontrast MR pulse sequences of the lumbar spine  were obtained.     Findings:  Five lumbar type vertebral bodies are identified.  There is 2 mm  anterior spondylolisthesis of L4 on L5 and 3 mm retrolisthesis of L5 on  S1. There is mild narrowing of the L3-L4 and L4-L5 discs with moderate  narrowing of the L5-S1 disc. There is lower lumbar disc desiccation from  L3 down to S1. Vertebral bodies maintain normal height.  Distal spinal  cord and conus medullaris appear unremarkable terminating at the L2  level.  Cauda equina appears unremarkable.  There are reactive  degenerative endplate signal abnormalities at the L5-S1 level. No focal  bone marrow edema or marrow replacing process. Paraspinal musculature is  preserved.  Retroperitoneal structures appear unremarkable.     L1-L2: No focal disc protrusion or extrusion. Facet joints appear  unremarkable. No significant neural foraminal or spinal canal stenosis.     L2-L3: No focal disc protrusion or extrusion. Facet joints appear  unremarkable. No significant neural foraminal or spinal canal stenosis.     L3-L4: There is mild concentric disc bulge. Facet joints demonstrate  mild hypertrophy. No neural foraminal narrowing or spinal canal  narrowing.     L4-L5: There is mild concentric disc bulge with mild bilateral facet and  ligamentum flavum hypertrophy. No neural foraminal or spinal canal  stenosis.     L5-S1: There is concentric disc bulge. There is a very large central  disc extrusion, which measures 15 mm transverse by 22 mm craniocaudal by  18 mm AP and migrates to the right and inferiorly to the S1-S2 disc  level.. The extruded disc partially effaces the right lateral recess  contacting the descending right S1 nerve root, results in mild narrowing  of the spinal canal and contacts and potentially compresses the  descending right S2 and S3 nerve roots. The facet joints appear  unremarkable. There  is mild bilateral neural foraminal narrowing.     IMPRESSION:     1. Large central disc extrusion at the L5-S1 level, which migrates to  the right and inferiorly contacting the right S1, S2 and S3 nerve roots  and resulting in mild narrowing of the spinal canal and partial  effacement of the right lateral recess.  2. Moderate L5-S1 disc degeneration and mild L3-L4 and L4-L5 disc  degeneration with mild multilevel facet arthritis.     Electronically Signed By-Anil Smith On:7/27/2020 11:56 AM  This report was finalized on [id number] by  Anil Smith, .      Assessment and Plan: Mrs. Kate suffers from right S1 radiculopathy secondary to a large herniated disc at the L5-S1 level.  Though this disc is quite large she really has no sensorimotor impairment.  At this time I do not feel she is exhausted conservative measures.  I will keep keep her on limited activity at work with no lifting more than 15 pounds, no bending or twisting, no sitting or standing more than 1 hour at a time.  We will also refer her to physical therapy and interventional pain management for dedicated lumbar epidurals.  We will start her on a course of meloxicam and I have advised that she can increase the Neurontin to 300 mg 3 times a day if she can tolerate this.  I will see her back in 1 month to see how she has responded.  I did discuss to her the most disc will resolve on their own some time.  If there is any worsening of her pain or weakness or she fails conservative measures then I would argue to proceed ahead with intervention.  At this time but I would only propose an L5-S1 microdiscectomy.      Problems Addressed this Visit     None

## 2020-08-25 ENCOUNTER — TREATMENT (OUTPATIENT)
Dept: PHYSICAL THERAPY | Facility: CLINIC | Age: 55
End: 2020-08-25

## 2020-08-25 DIAGNOSIS — M51.16 LUMBAR DISC DISEASE WITH RADICULOPATHY: Primary | ICD-10-CM

## 2020-08-25 DIAGNOSIS — M54.50 ACUTE MIDLINE LOW BACK PAIN, UNSPECIFIED WHETHER SCIATICA PRESENT: ICD-10-CM

## 2020-08-25 PROCEDURE — 97535 SELF CARE MNGMENT TRAINING: CPT | Performed by: PHYSICAL THERAPIST

## 2020-08-25 PROCEDURE — 97162 PT EVAL MOD COMPLEX 30 MIN: CPT | Performed by: PHYSICAL THERAPIST

## 2020-08-25 PROCEDURE — 97530 THERAPEUTIC ACTIVITIES: CPT | Performed by: PHYSICAL THERAPIST

## 2020-08-25 NOTE — PROGRESS NOTES
Physical Therapy Initial Evaluation and Plan of Care    Patient: Racheal Kate   : 1965  Diagnosis/ICD-10 Code:  Lumbar disc disease with radiculopathy [M51.16]  Referring practitioner: Sarath Corrigan MD  Date of Initial Visit: 2020  Today's Date: 2020  Patient seen for 1 sessions           Subjective Questionnaire: Oswestry: 64%    Subjective Evaluation    History of Present Illness  Mechanism of injury: Pt presents with new onset low back pain and RLE pain onset 8 weeks ago after lifting and moving a picnic table, felt a pop. Initially had low back pain and then started having really bad RLE pain. Medication is helping to manage her symptoms but still pretty painful. Reports occasional N/T in the back of her right thigh and tailbone, but only in certain positions and when she changes position it goes away. Can't sit or stand for more than a few minutes before pain increases.     MRI shows very large disc herniation L5-S1 central and to right.       Patient Occupation:  - has been off work since this injury Quality of life: excellent    Pain  Current pain rating: 3  At best pain rating: 3  At worst pain rating: 10  Relieving factors: rest, change in position and medications    Social Support  Lives with: adult children    Diagnostic Tests  MRI studies: abnormal    Patient Goals  Patient goals for therapy: decreased pain, increased strength, independence with ADLs/IADLs and improved balance        Precautions: large disc herniation L5-S1    Objective          Postural Observations    Additional Postural Observation Details  Weight shifted to left in standing    Active Range of Motion     Additional Active Range of Motion Details  Lumbar AROM:  Flexion: severe limitation and pain down leg  Extension: no limitation and no pain  Left lateral flexion: mild limitation no pain       Right lateral flexion: mild limitation no pain    Passive Range of Motion     Additional Passive Range  of Motion Details  Right hip limited to 90 deg and caused radicular symptoms (pain down posterior right thigh and numbness).     Strength/Myotome Testing     Left Hip   Planes of Motion   Abduction: 4  Adduction: 4    Right Hip   Planes of Motion   Abduction: 4  Adduction: 4    Left Knee   Flexion: 4+  Extension: 4+    Right Knee   Flexion: 4+  Extension: 4+    Left Ankle/Foot   Dorsiflexion: 5    Right Ankle/Foot   Dorsiflexion: 5    Tests       Thoracic   Positive slump.     Lumbar     Left   Positive crossed SLR.     Right   Positive passive SLR.     Lumbar Flexibility Comments:   Very tight HS on R, caused radicular symptoms with 90/90 test          Assessment & Plan     Assessment  Impairments: abnormal or restricted ROM, activity intolerance, impaired physical strength, lacks appropriate home exercise program and pain with function  Assessment details: The patient is a 54 y.o. female who presents to physical therapy today for low back pain with RLE radiculopathy due to herniated disc L5-S1. Upon initial evaluation, the patient demonstrates the following impairments: Decreased ROM, high pain level, radicular symptoms, decreased hip ROM, intermittent N/T in her leg. 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 The patient would benefit from skilled PT services to address functional limitations and impairments and to improve patient quality of life.      Prognosis: good  Functional Limitations: carrying objects, lifting, sleeping, walking, uncomfortable because of pain, moving in bed, sitting, standing, stooping, reaching overhead and unable to perform repetitive tasks  Goals  Plan Goals: STG:  Pt will be independent and compliant with initial HEP in 3 weeks.  Pt will report a 25% improvement in symptoms since starting therapy in 3 weeks.  Pt will report pain level at worst <5 during standing activity in 3 weeks.  Pt will be independent with  postural corrections in 2 weeks in order to decrease strain on back.   LTG:  Pt will be independent with final HEP for self-management of condition by DC.  Pt will improve score on Oswesty to less than 30% by DC.   Pt will report a 75% improvement in symptoms by DC in order to allow return to PLOF.  Pt will not have any radicular symptoms by DC.       Plan  Therapy options: will be seen for skilled physical therapy services  Planned modality interventions: cryotherapy, electrical stimulation/Russian stimulation, TENS, traction and thermotherapy (hydrocollator packs)  Planned therapy interventions: abdominal trunk stabilization, body mechanics training, flexibility, functional ROM exercises, gait training, joint mobilization, home exercise program, manual therapy, neuromuscular re-education, postural training, soft tissue mobilization, spinal/joint mobilization, strengthening, stretching, therapeutic activities and motor coordination training  Other planned therapy interventions: Dry needling  Frequency: 2x week  Treatment plan discussed with: patient  Plan details: 30 visits        See flowsheets for treatment detail.    History # of Personal Factors and/or Comorbidities: MODERATE (1-2)  Examination of Body System(s): # of elements: HIGH (4+)  Clinical Presentation: EVOLVING  Clinical Decision Making: MODERATE      Timed:         Manual Therapy:         mins  83272;     Therapeutic Exercise:         mins  35966;     Neuromuscular Eddie:        mins  18188;    Therapeutic Activity:     10     mins  87258;     Gait Training:           mins  99136;     Ultrasound:          mins  89501;    Ionto                                   mins   63513  Self Care                       10     mins   44918      Un-Timed:  Electrical Stimulation:         mins  16235 ( );  Dry Needling          mins self-pay  Traction          mins 76265  Low Eval          Mins  37288  Mod Eval     35     Mins  71574  High Eval                             Mins  89557  Re-Eval                               mins  21118      Timed Treatment:   20   mins   Total Treatment:     55   mins    PT SIGNATURE: Meaghan Rocha, PT   DATE TREATMENT INITIATED: 8/25/2020    Initial Certification  Certification Period: 11/23/2020  I certify that the therapy services are furnished while this patient is under my care.  The services outlined above are required by this patient, and will be reviewed every 90 days.     PHYSICIAN: Sarath Corrigan MD      DATE:     Please sign and return via fax to [phone].. Thank you, [facility].

## 2020-08-28 RX ORDER — GABAPENTIN 300 MG/1
CAPSULE ORAL
Qty: 180 CAPSULE | Refills: 0 | Status: SHIPPED | OUTPATIENT
Start: 2020-08-28 | End: 2020-11-23

## 2020-09-01 ENCOUNTER — TREATMENT (OUTPATIENT)
Dept: PHYSICAL THERAPY | Facility: CLINIC | Age: 55
End: 2020-09-01

## 2020-09-01 DIAGNOSIS — M51.16 LUMBAR DISC DISEASE WITH RADICULOPATHY: ICD-10-CM

## 2020-09-01 DIAGNOSIS — M54.50 ACUTE MIDLINE LOW BACK PAIN, UNSPECIFIED WHETHER SCIATICA PRESENT: Primary | ICD-10-CM

## 2020-09-01 PROCEDURE — 97530 THERAPEUTIC ACTIVITIES: CPT | Performed by: PHYSICAL THERAPIST

## 2020-09-01 PROCEDURE — 97110 THERAPEUTIC EXERCISES: CPT | Performed by: PHYSICAL THERAPIST

## 2020-09-01 NOTE — PROGRESS NOTES
Physical Therapy Daily Progress Note    Patient: Racheal Kate  : 1965  Referring practitioner: Sarath Corrigan MD  Today's Date: 2020    VISIT#: 2    Subjective   Racheal Kate reports: Doing ok, about the same since last time. Have been working on exercises and they are going well. Had to sit more yesterday and her right leg symptoms are worse.       Objective     See Exercise, Manual, and Modality Logs for complete treatment.     Patient Education: progressed HEP. Instructed to perform prone ex 2-3 times per day and sidelying decompression 2-3 times per day and monitor response. Again educated in centralization of symptoms and she verbalizes good understanding.    Assessment/Plan  Sidelying decompression completely eliminated her RLE symptoms. Less pain with transition to TYESHA today. Also able to do press ups without leg symptoms today.    Progress per Plan of Care            Timed:         Manual Therapy:         mins  07664;     Therapeutic Exercise:    10     mins  24338;     Neuromuscular Eddie:        mins  88990;    Therapeutic Activity:     30     mins  33865;     Gait Training:           mins  24630;     Ultrasound:          mins  60830;    Ionto:                                   mins   54472  Self Care:                            mins   00542    Un-Timed:  Electrical Stimulation:         mins  76470 ( );  Dry Needling          mins self-pay  Traction          mins 66367  Re-Eval                               mins  91842    Timed Treatment:   40   mins   Total Treatment:     40   mins    Meaghan Rocha PT  Physical Therapist

## 2020-09-10 ENCOUNTER — TREATMENT (OUTPATIENT)
Dept: PHYSICAL THERAPY | Facility: CLINIC | Age: 55
End: 2020-09-10

## 2020-09-10 DIAGNOSIS — M54.50 ACUTE MIDLINE LOW BACK PAIN, UNSPECIFIED WHETHER SCIATICA PRESENT: Primary | ICD-10-CM

## 2020-09-10 DIAGNOSIS — M51.16 LUMBAR DISC DISEASE WITH RADICULOPATHY: ICD-10-CM

## 2020-09-10 PROCEDURE — 97110 THERAPEUTIC EXERCISES: CPT | Performed by: PHYSICAL THERAPIST

## 2020-09-10 PROCEDURE — 97530 THERAPEUTIC ACTIVITIES: CPT | Performed by: PHYSICAL THERAPIST

## 2020-09-10 NOTE — PROGRESS NOTES
Physical Therapy Daily Progress Note    Patient: Racheal Kate  : 1965  Referring practitioner: Sarath Corrigan MD  Today's Date: 9/10/2020    VISIT#: 3    Subjective   Racheal Kate reports: Says she had been doing pretty well. Was doing better and having less symptoms, but still some RLE symptoms. Got complete relief of symptoms when in sidelying but as soon as she got up they would come back slightly. Yesterday was a bad day for some reason. Says she is about 30% improved overall. Getting about 2-3,000 steps per day. At worst pain level has been up to 6/10.     Objective     See Exercise, Manual, and Modality Logs for complete treatment.     Patient Education: spent increased time educating pt on proper positioning for sidelying decompression in order to obtain proper positioning. Instructed to place role lower. Challenged her to get up to 5,000 steps.     Assessment & Plan     Assessment  Assessment details: Good response to session, less leg symptoms. Only slight changes made to home program due to responding well to therapy. Added prone press ups to HEP.     Goals  Plan Goals: STG:  Pt will be independent and compliant with initial HEP in 3 weeks. MET  Pt will report a 25% improvement in symptoms since starting therapy in 3 weeks. MET  Pt will report pain level at worst <5 during standing activity in 3 weeks. NOT MET  Pt will be independent with postural corrections in 2 weeks in order to decrease strain on back. MET  LTG:  Pt will be independent with final HEP for self-management of condition by DC.  Pt will improve score on Oswesty to less than 30% by DC.   Pt will report a 75% improvement in symptoms by DC in order to allow return to PLOF.  Pt will not have any radicular symptoms by DC.       Plan  Frequency: 1x week      Progress per Plan of Care        Timed:         Manual Therapy:         mins  93674;     Therapeutic Exercise:    10     mins  29658;     Neuromuscular Eddie:        mins  57691;     Therapeutic Activity:     35     mins  36506;     Gait Training:           mins  74543;     Ultrasound:          mins  62638;    Ionto:                                   mins   60837  Self Care:                            mins   01790    Un-Timed:  Electrical Stimulation:         mins  48113 ( );  Dry Needling          mins self-pay  Traction          mins 46737  Re-Eval                               mins  59265    Timed Treatment:   45   mins   Total Treatment:     45   mins    Meaghan Rocha PT  Physical Therapist

## 2020-09-15 ENCOUNTER — TELEPHONE (OUTPATIENT)
Dept: NEUROSURGERY | Facility: CLINIC | Age: 55
End: 2020-09-15

## 2020-09-15 NOTE — TELEPHONE ENCOUNTER
Pt would like to return to work part time starting next week for 3-4 hrs a day. She is needing a statement for her employer.  If she can return to work she would like to come by the office to pick the statement up. She said she has made some progress with physical therapy and is able to do more now than she could do a month ago.    Pt contact# [phone]  Best time to call: anytime

## 2020-09-16 ENCOUNTER — TREATMENT (OUTPATIENT)
Dept: PHYSICAL THERAPY | Facility: CLINIC | Age: 55
End: 2020-09-16

## 2020-09-16 DIAGNOSIS — M51.16 LUMBAR DISC DISEASE WITH RADICULOPATHY: ICD-10-CM

## 2020-09-16 DIAGNOSIS — M54.50 ACUTE MIDLINE LOW BACK PAIN, UNSPECIFIED WHETHER SCIATICA PRESENT: Primary | ICD-10-CM

## 2020-09-16 PROCEDURE — 97530 THERAPEUTIC ACTIVITIES: CPT | Performed by: PHYSICAL THERAPIST

## 2020-09-16 PROCEDURE — 97110 THERAPEUTIC EXERCISES: CPT | Performed by: PHYSICAL THERAPIST

## 2020-09-16 NOTE — TELEPHONE ENCOUNTER
What is patient's occupation, and per dr. Corrigan notes he was to see her back in one month from 8/18/2020 for further evaluation after conservative methods. Can you see if her symptoms have resolved.

## 2020-09-16 NOTE — PROGRESS NOTES
Physical Therapy Daily Progress Note    Patient: Racheal Kate  : 1965  Referring practitioner: Sarath Corrigan MD  Today's Date: 2020    VISIT#: 4    Subjective   Racheal Kate reports: Says she is doing pretty well, is continuing to feel better. Pain intensity is less, less leg symptoms. Is able to walk more (5-6,000 steps) and is able to sit more without leg symptoms. Says she did have a little set back, she fell on Saturday (tripped on a rock) and her symptoms were worse for a few days but has returned to doing better.     Objective     See Exercise, Manual, and Modality Logs for complete treatment.     Patient Education:    Assessment & Plan     Assessment  Assessment details: Progressed exercises today to hooklying ex. Good tolerance during but after did have slightly increased posterior right leg symptoms. Ended with sidelying decompression which then decreased radicular symptoms. Still added to HEP but with caution that if her leg symptoms continue to increase that she should stop new exercises. She is overall making very good progress with therapy.     Goals  Plan Goals: STG:  Pt will be independent and compliant with initial HEP in 3 weeks. MET  Pt will report a 25% improvement in symptoms since starting therapy in 3 weeks. MET  Pt will report pain level at worst <5 during standing activity in 3 weeks. NOT MET  Pt will be independent with postural corrections in 2 weeks in order to decrease strain on back. MET  LTG:  Pt will be independent with final HEP for self-management of condition by DC.  Pt will improve score on Oswesty to less than 30% by DC.   Pt will report a 75% improvement in symptoms by DC in order to allow return to PLOF.  Pt will not have any radicular symptoms by DC.       Plan  Frequency: 1x week      Progress per Plan of Care        Timed:         Manual Therapy:         mins  22154;     Therapeutic Exercise:    30     mins  55897;     Neuromuscular Eddie:        mins  25009;     Therapeutic Activity:     25     mins  76097;     Gait Training:           mins  77440;     Ultrasound:          mins  21589;    Ionto:                                   mins   10459  Self Care:                            mins   38498    Un-Timed:  Electrical Stimulation:         mins  67317 ( );  Dry Needling          mins self-pay  Traction          mins 40055  Re-Eval                               mins  79791    Timed Treatment:   55   mins   Total Treatment:     55   mins    Meaghan Rocha PT  Physical Therapist

## 2020-09-16 NOTE — TELEPHONE ENCOUNTER
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 Her work did get her a desk rise so she can stand and work as well if needed.

## 2020-09-24 ENCOUNTER — TREATMENT (OUTPATIENT)
Dept: PHYSICAL THERAPY | Facility: CLINIC | Age: 55
End: 2020-09-24

## 2020-09-24 DIAGNOSIS — M54.50 ACUTE MIDLINE LOW BACK PAIN, UNSPECIFIED WHETHER SCIATICA PRESENT: Primary | ICD-10-CM

## 2020-09-24 DIAGNOSIS — M51.16 LUMBAR DISC DISEASE WITH RADICULOPATHY: ICD-10-CM

## 2020-09-24 PROCEDURE — 97110 THERAPEUTIC EXERCISES: CPT | Performed by: PHYSICAL THERAPIST

## 2020-09-24 PROCEDURE — 97530 THERAPEUTIC ACTIVITIES: CPT | Performed by: PHYSICAL THERAPIST

## 2020-09-24 NOTE — PROGRESS NOTES
Physical Therapy Daily Progress Note    Patient: Racheal Kate  : 1965  Referring practitioner: Sarath Corrigan MD  Today's Date: 2020    VISIT#: 5    Subjective   Racheal Kate reports: Says she got her first epidural injection yesterday. Was continuing to improve over the last week. Is now able to walk 2 miles in the morning. At worst pain has been 3/10 in the last week.       Objective     See Exercise, Manual, and Modality Logs for complete treatment.     Assessment & Plan     Assessment  Assessment details: Making good progress with therapy. Continues to have very slight upper right posterior thigh pain after hooklying exercises so minimal progression of those today. No change after sidelying decompression - decided to discontinue from HEP. She is having less radicular symptoms overall - continue with current treatment approach. She has met all STG at this time.     Goals  Plan Goals: STG:  Pt will be independent and compliant with initial HEP in 3 weeks. MET  Pt will report a 25% improvement in symptoms since starting therapy in 3 weeks. MET  Pt will report pain level at worst <5 during standing activity in 3 weeks. MET  Pt will be independent with postural corrections in 2 weeks in order to decrease strain on back. MET  LTG:  Pt will be independent with final HEP for self-management of condition by DC.  Pt will improve score on Oswesty to less than 30% by DC.   Pt will report a 75% improvement in symptoms by DC in order to allow return to PLOF.  Pt will not have any radicular symptoms by DC.       Plan  Frequency: 1x week      Progress per Plan of Care          Timed:         Manual Therapy:         mins  76371;     Therapeutic Exercise:    20     mins  68455;     Neuromuscular Eddie:        mins  18045;    Therapeutic Activity:     25     mins  27964;     Gait Training:           mins  33248;     Ultrasound:          mins  99382;    Ionto:                                   mins   50356  Self  Care:                            mins   93109    Un-Timed:  Electrical Stimulation:         mins  20550 ( );  Dry Needling          mins self-pay  Traction          mins 41042  Re-Eval                               mins  05473    Timed Treatment:   45   mins   Total Treatment:     45   mins    Meaghan Rocha, PT  Physical Therapist

## 2020-09-29 ENCOUNTER — TREATMENT (OUTPATIENT)
Dept: PHYSICAL THERAPY | Facility: CLINIC | Age: 55
End: 2020-09-29

## 2020-09-29 DIAGNOSIS — M51.16 LUMBAR DISC DISEASE WITH RADICULOPATHY: ICD-10-CM

## 2020-09-29 DIAGNOSIS — M54.50 ACUTE MIDLINE LOW BACK PAIN, UNSPECIFIED WHETHER SCIATICA PRESENT: Primary | ICD-10-CM

## 2020-09-29 PROCEDURE — 97530 THERAPEUTIC ACTIVITIES: CPT | Performed by: PHYSICAL THERAPIST

## 2020-09-29 PROCEDURE — 97012 MECHANICAL TRACTION THERAPY: CPT | Performed by: PHYSICAL THERAPIST

## 2020-09-29 PROCEDURE — 97110 THERAPEUTIC EXERCISES: CPT | Performed by: PHYSICAL THERAPIST

## 2020-09-29 NOTE — PROGRESS NOTES
Physical Therapy Daily Progress Note    Patient: Racheal Kate  : 1965  Referring practitioner: Sarath Corrigan MD  Today's Date: 2020    VISIT#: 6    Subjective   Racheal Kate reports: Says she is doing well, continuing to make progress. Was finally able to sit on the couch for the first time in weeks. Still getting the leg symptoms some, especially at the end of the day. The intensity is no where near as bad. Went back to work yesterday and did ok, but 4 hours was definitely the max she could tolerate.       Objective     See Exercise, Manual, and Modality Logs for complete treatment.     Patient Education: continue HEP    Assessment & Plan     Assessment  Assessment details: Good response to session, continuing to progress and function is improving. Initiated lumbar mechanical traction today with good tolerance, no adverse effects.     Goals  Plan Goals: STG:  Pt will be independent and compliant with initial HEP in 3 weeks. MET  Pt will report a 25% improvement in symptoms since starting therapy in 3 weeks. MET  Pt will report pain level at worst <5 during standing activity in 3 weeks. MET  Pt will be independent with postural corrections in 2 weeks in order to decrease strain on back. MET  LTG:  Pt will be independent with final HEP for self-management of condition by DC.  Pt will improve score on Oswesty to less than 30% by DC.   Pt will report a 75% improvement in symptoms by DC in order to allow return to PLOF.  Pt will not have any radicular symptoms by DC.       Plan  Frequency: 1x week      Progress per Plan of Care          Timed:         Manual Therapy:         mins  66487;     Therapeutic Exercise:    15     mins  31252;     Neuromuscular Eddie:        mins  09598;    Therapeutic Activity:     25     mins  99182;     Gait Training:           mins  95984;     Ultrasound:          mins  93958;    Ionto:                                   mins   61199  Self Care:                             mins   67758    Un-Timed:  Electrical Stimulation:         mins  63752 ( );  Dry Needling          mins self-pay  Traction     15     mins 96857  Re-Eval                               mins  51930    Timed Treatment:   40   mins   Total Treatment:     55   mins    Meaghan Rocha, PT  Physical Therapist

## 2020-09-30 ENCOUNTER — OFFICE VISIT (OUTPATIENT)
Dept: NEUROSURGERY | Facility: CLINIC | Age: 55
End: 2020-09-30

## 2020-09-30 DIAGNOSIS — M51.16 LUMBAR DISC DISEASE WITH RADICULOPATHY: Primary | ICD-10-CM

## 2020-09-30 PROCEDURE — 99441 PR PHYS/QHP TELEPHONE EVALUATION 5-10 MIN: CPT | Performed by: NEUROLOGICAL SURGERY

## 2020-09-30 NOTE — PROGRESS NOTES
Mrs. Kate is here today for tele-visit follow-up for her lumbar back pain with radiculopathy.  She states she has been doing very well.  She really has no leg pain.  She is back to work.  She feels like this is helped her pain immensely and at this time she can follow back up with me on a as needed basis.    You have chosen to receive care through a telephone visit. Do you consent to use a telephone visit for your medical care today? Yes    I spent 5 minutes with the patient in direct communication obtaining history, reviewing the films, discussing the pathology and treatment plans.

## 2020-10-07 ENCOUNTER — TREATMENT (OUTPATIENT)
Dept: PHYSICAL THERAPY | Facility: CLINIC | Age: 55
End: 2020-10-07

## 2020-10-07 DIAGNOSIS — M54.50 ACUTE MIDLINE LOW BACK PAIN, UNSPECIFIED WHETHER SCIATICA PRESENT: Primary | ICD-10-CM

## 2020-10-07 DIAGNOSIS — M51.16 LUMBAR DISC DISEASE WITH RADICULOPATHY: ICD-10-CM

## 2020-10-07 PROCEDURE — 97012 MECHANICAL TRACTION THERAPY: CPT | Performed by: PHYSICAL THERAPIST

## 2020-10-07 PROCEDURE — 97530 THERAPEUTIC ACTIVITIES: CPT | Performed by: PHYSICAL THERAPIST

## 2020-10-07 NOTE — PROGRESS NOTES
Physical Therapy Daily Progress Note    Patient: Racheal Kate  : 1965  Referring practitioner: Sarath Corrigan MD  Today's Date: 10/7/2020    VISIT#: 7    Subjective   Racheal Kate reports: Says she isn't doing as well, on Friday after lifting groceries and boxes to take to work she started having leg pain again and has had it ever since. The exercises take it away right when she is doing them but as soon as she stood up the leg got worse again. Says she did really well after traction last time for a few hours.       Objective     See Exercise, Manual, and Modality Logs for complete treatment.     Patient Education: return to original exercise routine, hold on exercise progression until leg symptoms improve.     Assessment & Plan     Assessment  Assessment details: Leg symptoms went away completely during sidelying decompression, but returned as soon as she got up. In prone ex today she still felt some slight leg symptoms but better than when in standing. Continued traction today, felt some light soreness in her low back initially but leg pain dissipated and low back symptoms improved.     Plan  Frequency: 1x week      Progress per Plan of Care          Timed:         Manual Therapy:         mins  32042;     Therapeutic Exercise:         mins  61508;     Neuromuscular Eddie:        mins  56449;    Therapeutic Activity:     30     mins  19017;     Gait Training:           mins  54345;     Ultrasound:          mins  15164;    Ionto:                                   mins   82006  Self Care:                            mins   39292    Un-Timed:  Electrical Stimulation:         mins  01039 ( );  Dry Needling          mins self-pay  Traction     15     mins 02212  Re-Eval                               mins  12747    Timed Treatment:   30   mins   Total Treatment:     45   mins    Meaghan Rocha PT  Physical Therapist

## 2020-10-13 ENCOUNTER — TREATMENT (OUTPATIENT)
Dept: PHYSICAL THERAPY | Facility: CLINIC | Age: 55
End: 2020-10-13

## 2020-10-13 ENCOUNTER — PATIENT MESSAGE (OUTPATIENT)
Dept: FAMILY MEDICINE CLINIC | Facility: CLINIC | Age: 55
End: 2020-10-13

## 2020-10-13 DIAGNOSIS — M54.50 ACUTE MIDLINE LOW BACK PAIN, UNSPECIFIED WHETHER SCIATICA PRESENT: Primary | ICD-10-CM

## 2020-10-13 DIAGNOSIS — M51.16 LUMBAR DISC DISEASE WITH RADICULOPATHY: ICD-10-CM

## 2020-10-13 PROCEDURE — 97110 THERAPEUTIC EXERCISES: CPT | Performed by: PHYSICAL THERAPIST

## 2020-10-13 PROCEDURE — 97530 THERAPEUTIC ACTIVITIES: CPT | Performed by: PHYSICAL THERAPIST

## 2020-10-13 PROCEDURE — 97012 MECHANICAL TRACTION THERAPY: CPT | Performed by: PHYSICAL THERAPIST

## 2020-10-13 NOTE — PROGRESS NOTES
Physical Therapy Daily Progress Note    Patient: Racheal Kate  : 1965  Referring practitioner: Sarath Corrigan MD  Today's Date: 10/13/2020    VISIT#: 8    Subjective   Racheal Kate reports: Doing ok, better than last week, pain is more variable. Still getting leg pain but now the exercises seem to help the leg pain, it just comes back when she bends forward or twists.       Objective     See Exercise, Manual, and Modality Logs for complete treatment.     Patient Education:    Assessment & Plan     Assessment  Assessment details: Still variable leg pain throughout session, no pain with exercises except PPT caused slight leg pain so only performed 1 rep. Good tolerance to traction, no low back soreness today and no leg pain during.           Progress per Plan of Care            Timed:         Manual Therapy:         mins  07565;     Therapeutic Exercise:    15     mins  72111;     Neuromuscular Eddie:        mins  97857;    Therapeutic Activity:     25     mins  46193;     Gait Training:           mins  73302;     Ultrasound:          mins  06273;    Ionto:                                   mins   45435  Self Care:                            mins   59640    Un-Timed:  Electrical Stimulation:         mins  68389 ( );  Dry Needling          mins self-pay  Traction     15     mins 88471  Re-Eval                               mins  87231    Timed Treatment:   40   mins   Total Treatment:     55   mins    Meaghan Rocha PT  Physical Therapist

## 2020-10-14 RX ORDER — SULFAMETHOXAZOLE AND TRIMETHOPRIM 800; 160 MG/1; MG/1
1 TABLET ORAL 2 TIMES DAILY
Qty: 14 TABLET | Refills: 0 | Status: SHIPPED | OUTPATIENT
Start: 2020-10-14 | End: 2021-04-07

## 2020-10-20 ENCOUNTER — TREATMENT (OUTPATIENT)
Dept: PHYSICAL THERAPY | Facility: CLINIC | Age: 55
End: 2020-10-20

## 2020-10-20 DIAGNOSIS — M51.16 LUMBAR DISC DISEASE WITH RADICULOPATHY: ICD-10-CM

## 2020-10-20 DIAGNOSIS — M54.50 ACUTE MIDLINE LOW BACK PAIN, UNSPECIFIED WHETHER SCIATICA PRESENT: Primary | ICD-10-CM

## 2020-10-20 PROCEDURE — 97530 THERAPEUTIC ACTIVITIES: CPT | Performed by: PHYSICAL THERAPIST

## 2020-10-20 PROCEDURE — 97110 THERAPEUTIC EXERCISES: CPT | Performed by: PHYSICAL THERAPIST

## 2020-10-20 PROCEDURE — 97012 MECHANICAL TRACTION THERAPY: CPT | Performed by: PHYSICAL THERAPIST

## 2020-10-20 NOTE — PROGRESS NOTES
Physical Therapy Daily Progress Note    Patient: Racheal Kate  : 1965  Referring practitioner: Sarath Corrigan MD  Today's Date: 10/20/2020    VISIT#: 9    Subjective   Racheal Kate reports: Doing ok, a little better. Still having some leg pain but did really well over the weekend. Getting another injection tomorrow    Objective     See Exercise, Manual, and Modality Logs for complete treatment.     Patient Education:    Assessment & Plan     Assessment  Assessment details: Good response to session, continues to get intermittent RLE symptoms. She is getting another injection tomorrow and reassess plan next week depending on her response.     Goals  Plan Goals: STG:  Pt will be independent and compliant with initial HEP in 3 weeks. MET  Pt will report a 25% improvement in symptoms since starting therapy in 3 weeks. MET  Pt will report pain level at worst <5 during standing activity in 3 weeks. MET  Pt will be independent with postural corrections in 2 weeks in order to decrease strain on back. MET  LTG:  Pt will be independent with final HEP for self-management of condition by DC.  Pt will improve score on Oswesty to less than 30% by DC.   Pt will report a 75% improvement in symptoms by DC in order to allow return to PLOF. NOT MET  Pt will not have any radicular symptoms by DC. nOT MET      Plan  Frequency: 1x week          Progress per Plan of Care            Timed:         Manual Therapy:         mins  04736;     Therapeutic Exercise:    15     mins  49799;     Neuromuscular Eddie:        mins  63032;    Therapeutic Activity:     25     mins  09959;     Gait Training:           mins  79909;     Ultrasound:          mins  36603;    Ionto:                                   mins   14931  Self Care:                            mins   88580    Un-Timed:  Electrical Stimulation:         mins  36986 ( );  Dry Needling          mins self-pay  Traction     15     mins 42769  Re-Eval                                mins  37420    Timed Treatment:  40    mins   Total Treatment:     55   mins    Meaghan Rocha, PT  Physical Therapist

## 2020-10-27 ENCOUNTER — TREATMENT (OUTPATIENT)
Dept: PHYSICAL THERAPY | Facility: CLINIC | Age: 55
End: 2020-10-27

## 2020-10-27 DIAGNOSIS — M54.50 ACUTE MIDLINE LOW BACK PAIN, UNSPECIFIED WHETHER SCIATICA PRESENT: Primary | ICD-10-CM

## 2020-10-27 DIAGNOSIS — M51.16 LUMBAR DISC DISEASE WITH RADICULOPATHY: ICD-10-CM

## 2020-10-27 PROCEDURE — 97012 MECHANICAL TRACTION THERAPY: CPT | Performed by: PHYSICAL THERAPIST

## 2020-10-27 PROCEDURE — 97530 THERAPEUTIC ACTIVITIES: CPT | Performed by: PHYSICAL THERAPIST

## 2020-10-27 PROCEDURE — 97110 THERAPEUTIC EXERCISES: CPT | Performed by: PHYSICAL THERAPIST

## 2020-10-27 NOTE — PROGRESS NOTES
Physical Therapy Daily Progress Note    Patient: Racheal Kate  : 1965  Referring practitioner: Sarath Corrigan MD  Today's Date: 10/27/2020    VISIT#: 10    Subjective   Racheal Kate reports: got epidural last week, and it does seem to be helping. Felt really good last week but a little more sore this week. Less intense leg pain.       Objective     See Exercise, Manual, and Modality Logs for complete treatment.     Patient Education: continue HEP    Assessment & Plan     Assessment  Assessment details: Good response to session, increased traction to 60 lbs. Felt find during but did have slight back soreness after. Intermittent right posterior thigh symptoms today throughout session so only slightly progressed exercises.     Plan  Frequency: 1x week      Progress per Plan of Care       Timed:         Manual Therapy:         mins  47007;     Therapeutic Exercise:    15     mins  92622;     Neuromuscular Eddie:        mins  74301;    Therapeutic Activity:     25     mins  05964;     Gait Training:           mins  01052;     Ultrasound:          mins  06342;    Ionto:                                   mins   52609  Self Care:                            mins   16394    Un-Timed:  Electrical Stimulation:         mins  10958 ( );  Dry Needling          mins self-pay  Traction     15     mins 97918  Re-Eval                               mins  56821    Timed Treatment:   40   mins   Total Treatment:     55   mins    Meaghan Rocha PT  Physical Therapist

## 2020-11-03 ENCOUNTER — TREATMENT (OUTPATIENT)
Dept: PHYSICAL THERAPY | Facility: CLINIC | Age: 55
End: 2020-11-03

## 2020-11-03 DIAGNOSIS — M51.16 LUMBAR DISC DISEASE WITH RADICULOPATHY: ICD-10-CM

## 2020-11-03 DIAGNOSIS — M54.50 ACUTE MIDLINE LOW BACK PAIN, UNSPECIFIED WHETHER SCIATICA PRESENT: Primary | ICD-10-CM

## 2020-11-03 PROCEDURE — 97530 THERAPEUTIC ACTIVITIES: CPT | Performed by: PHYSICAL THERAPIST

## 2020-11-03 PROCEDURE — 97110 THERAPEUTIC EXERCISES: CPT | Performed by: PHYSICAL THERAPIST

## 2020-11-03 PROCEDURE — 97012 MECHANICAL TRACTION THERAPY: CPT | Performed by: PHYSICAL THERAPIST

## 2020-11-03 NOTE — PROGRESS NOTES
Physical Therapy Daily Progress Note    Patient: Racheal Kate  : 1965  Referring practitioner: Sarath Corrigan MD  Today's Date: 11/3/2020    VISIT#: 11    Subjective   Racheal Kate reports: Says she is doing ok, not taking as much pain meds, prolonged sitting still bothers her, still getting occasional leg pain.       Objective     See Exercise, Manual, and Modality Logs for complete treatment.     Patient Education: adjusted HEP, progressed core strengthening    Assessment & Plan     Assessment  Assessment details: Overall good response to session, started with supine ex and progressed core strength, her symptoms were slightly worse after. Followed with prone exercises to decrease symptoms. Continued with mechanical traction at end of session.     Plan  Frequency: 1x week      Progress per Plan of Care            Timed:         Manual Therapy:         mins  81329;     Therapeutic Exercise:    15     mins  52306;     Neuromuscular Eddie:        mins  12482;    Therapeutic Activity:     25     mins  64017;     Gait Training:           mins  03660;     Ultrasound:          mins  19851;    Ionto:                                   mins   05236  Self Care:                            mins   08390    Un-Timed:  Electrical Stimulation:         mins  42296 ( );  Dry Needling          mins self-pay  Traction     15     mins 01095  Re-Eval                               mins  99779    Timed Treatment:   40   mins   Total Treatment:     55   mins    Meaghan Rocha PT  Physical Therapist

## 2020-11-10 ENCOUNTER — TREATMENT (OUTPATIENT)
Dept: PHYSICAL THERAPY | Facility: CLINIC | Age: 55
End: 2020-11-10

## 2020-11-10 DIAGNOSIS — M54.50 ACUTE MIDLINE LOW BACK PAIN, UNSPECIFIED WHETHER SCIATICA PRESENT: Primary | ICD-10-CM

## 2020-11-10 DIAGNOSIS — M51.16 LUMBAR DISC DISEASE WITH RADICULOPATHY: ICD-10-CM

## 2020-11-10 PROCEDURE — 97012 MECHANICAL TRACTION THERAPY: CPT | Performed by: PHYSICAL THERAPIST

## 2020-11-10 PROCEDURE — 97110 THERAPEUTIC EXERCISES: CPT | Performed by: PHYSICAL THERAPIST

## 2020-11-10 PROCEDURE — 97530 THERAPEUTIC ACTIVITIES: CPT | Performed by: PHYSICAL THERAPIST

## 2020-11-17 ENCOUNTER — TREATMENT (OUTPATIENT)
Dept: PHYSICAL THERAPY | Facility: CLINIC | Age: 55
End: 2020-11-17

## 2020-11-17 DIAGNOSIS — M51.16 LUMBAR DISC DISEASE WITH RADICULOPATHY: ICD-10-CM

## 2020-11-17 DIAGNOSIS — M54.50 ACUTE MIDLINE LOW BACK PAIN, UNSPECIFIED WHETHER SCIATICA PRESENT: Primary | ICD-10-CM

## 2020-11-17 PROCEDURE — 97110 THERAPEUTIC EXERCISES: CPT | Performed by: PHYSICAL THERAPIST

## 2020-11-17 PROCEDURE — 97530 THERAPEUTIC ACTIVITIES: CPT | Performed by: PHYSICAL THERAPIST

## 2020-11-17 NOTE — PROGRESS NOTES
Re-Assessment / Re-Certification        Patient: Racheal Kate   : 1965  Diagnosis/ICD-10 Code:  Acute midline low back pain, unspecified whether sciatica present [M54.5]  Referring practitioner: Sarath Corrigan MD  Date of Initial Visit: Type: THERAPY  Noted: 2020  Today's Date: 2020  Patient seen for 13 sessions      Subjective:   Racheal Kate reports: Doing well, had 4 really good days this past week. 50-60% improved since starting therapy. Still getting some bad days, but they are happening way less frequently and having more good days.     Subjective Questionnaire: Oswestry: 32%  Clinical Progress: improved  Home Program Compliance: Yes  Treatment has included: therapeutic exercise, neuromuscular re-education, therapeutic activity and traction    Subjective   Objective          Active Range of Motion     Additional Active Range of Motion Details  Lumbar AROM:  Flexion: moderate limitation and pain down leg  Extension: no limitation and no pain  Left lateral flexion: mild limitation no pain        Right lateral flexion: mild limitation no pain      Assessment & Plan     Assessment  Assessment details: Rachela is making Very good progress with therapy. She is having significantly less radicular symptoms and her activity level has improved and now able to walk >1 mile without pain. She does however continue to have pain with prolonged sitting > 30 minutes. She still has limited lumbar flexion ROM but it is improved since the initial evaluation.     Goals  Plan Goals: STG:  Pt will be independent and compliant with initial HEP in 3 weeks. MET  Pt will report a 25% improvement in symptoms since starting therapy in 3 weeks. MET  Pt will report pain level at worst <5 during standing activity in 3 weeks. MET  Pt will be independent with postural corrections in 2 weeks in order to decrease strain on back. MET  LTG:  Pt will be independent with final HEP for self-management of condition by DC. NOT  MET  Pt will improve score on Oswesty to less than 30% by DC. PARTIALLY MET  Pt will report a 75% improvement in symptoms by DC in order to allow return to [facility]. NOT MET  Pt will not have any radicular symptoms by DC. NOT MET      Plan  Therapy options: will be seen for skilled physical therapy services  Frequency: 1x week  Duration in visits: 15  Treatment plan discussed with: patient      Progress toward previous goals: Partially Met      Recommendations: Continue as planned  Timeframe: 3 months  Prognosis to achieve goals: good    PT Signature: Meaghan Rocha PT      Based upon review of the patient's progress and continued therapy plan, it is my medical opinion that Racheal Kate should continue physical therapy treatment at [facility] [address] [facility]  [address] [facility] [place] [id number].    Signature: __________________________________  Sarath Corrigan MD  Please sign and return via fax to [phone].. Thank you, [facility].    Timed:         Manual Therapy:         mins  28632;     Therapeutic Exercise:    25     mins  32035;     Neuromuscular Eddie:        mins  84563;    Therapeutic Activity:     20     mins  91260;     Gait Training:           mins  70350;     Ultrasound:          mins  82998;    Ionto                                   mins   17043  Self Care                            mins   13558      Un-Timed:  Electrical Stimulation:         mins  68753 ( );  Dry Needling          mins self-pay  Traction          mins 47722  Low Eval          Mins  61444  Mod Eval          Mins  76762  High Eval                            Mins  08661  Re-Eval                               mins  44896      Timed Treatment:   45   mins   Total Treatment:     45   mins

## 2020-11-23 RX ORDER — GABAPENTIN 300 MG/1
CAPSULE ORAL
Qty: 180 CAPSULE | Refills: 0 | Status: SHIPPED | OUTPATIENT
Start: 2020-11-23 | End: 2021-04-07

## 2020-12-16 ENCOUNTER — TREATMENT (OUTPATIENT)
Dept: PHYSICAL THERAPY | Facility: CLINIC | Age: 55
End: 2020-12-16

## 2020-12-16 DIAGNOSIS — M51.16 LUMBAR DISC DISEASE WITH RADICULOPATHY: ICD-10-CM

## 2020-12-16 DIAGNOSIS — M54.50 ACUTE MIDLINE LOW BACK PAIN, UNSPECIFIED WHETHER SCIATICA PRESENT: Primary | ICD-10-CM

## 2020-12-16 PROCEDURE — 97112 NEUROMUSCULAR REEDUCATION: CPT | Performed by: PHYSICAL THERAPIST

## 2020-12-16 PROCEDURE — 97530 THERAPEUTIC ACTIVITIES: CPT | Performed by: PHYSICAL THERAPIST

## 2020-12-16 PROCEDURE — 97110 THERAPEUTIC EXERCISES: CPT | Performed by: PHYSICAL THERAPIST

## 2020-12-16 NOTE — PROGRESS NOTES
Physical Therapy Daily Progress Note    Patient: Racheal Kate  : 1965  Referring practitioner: Sarath Corrigan MD  Today's Date: 2020    VISIT#: 14    Subjective   Racheal Kate reports: Says she is doing really well, doesn't have pain about 90% of the time. Just occasionally gets a twinge down her right posterior thigh when she moves in a certain way. Is able to sit for longer periods of time. Still will occasionally get pain with prolonged sitting in the car. Still a little tightness discomfort when bending forward.    Objective     See Exercise, Manual, and Modality Logs for complete treatment.     Patient Education:    Assessment & Plan     Assessment  Assessment details: Excellent response to session, no pain throughout and able to progress exercises.     Plan  Plan details: Pt to follow up in 1 month for check in and HEP progress as appropriate.           Progress per Plan of Care            Timed:         Manual Therapy:         mins  39735;     Therapeutic Exercise:    25     mins  43504;     Neuromuscular Eddie:    15    mins  09106;    Therapeutic Activity:     10     mins  12788;     Gait Training:           mins  84144;     Ultrasound:          mins  94180;    Ionto:                                   mins   93315  Self Care:                            mins   04650    Un-Timed:  Electrical Stimulation:         mins  96335 ( );  Dry Needling          mins self-pay  Traction          mins 64978  Re-Eval                               mins  33241    Timed Treatment:   50   mins   Total Treatment:     50   mins    Meaghan Rocha PT  Physical Therapist

## 2021-01-26 RX ORDER — METOPROLOL SUCCINATE 25 MG/1
TABLET, EXTENDED RELEASE ORAL
Qty: 90 TABLET | Refills: 0 | Status: SHIPPED | OUTPATIENT
Start: 2021-01-26 | End: 2021-04-07

## 2021-02-01 ENCOUNTER — TELEPHONE (OUTPATIENT)
Dept: PHYSICAL THERAPY | Facility: CLINIC | Age: 56
End: 2021-02-01

## 2021-02-01 NOTE — TELEPHONE ENCOUNTER
Called patient to check in and see if she needed to return for anymore therapy. Left voicemail asking her to call back.

## 2021-03-23 ENCOUNTER — DOCUMENTATION (OUTPATIENT)
Dept: PHYSICAL THERAPY | Facility: CLINIC | Age: 56
End: 2021-03-23

## 2021-03-23 NOTE — PROGRESS NOTES
Discharge Summary  Discharge Summary from Physical/Occupational Therapy Report    Patient: Racheal Kate   : 1965  Today's Date: 3/23/2021    Patient seen for 14 visits.  Dates of Service: 20 - 20    Discharge Status of Patient: Racheal has made excellent progress with therapy and rates herself as 90% improved. She was supposed to have 1 more follow up visit to check in but she cancelled that visit.    Goals: All Met    Discharge Plan: Continue with current home exercise program as instructed      Thank you for this referral to [facility].    SIGNATURE: Meaghan Rocha, PT

## 2021-03-29 RX ORDER — LEVOTHYROXINE SODIUM 0.1 MG/1
TABLET ORAL
Qty: 90 TABLET | Refills: 0 | Status: SHIPPED | OUTPATIENT
Start: 2021-03-29 | End: 2021-06-21

## 2021-03-29 RX ORDER — LOVASTATIN 20 MG/1
TABLET ORAL
Qty: 90 TABLET | Refills: 0 | Status: SHIPPED | OUTPATIENT
Start: 2021-03-29 | End: 2021-06-21

## 2021-04-07 ENCOUNTER — LAB (OUTPATIENT)
Dept: FAMILY MEDICINE CLINIC | Facility: CLINIC | Age: 56
End: 2021-04-07

## 2021-04-07 ENCOUNTER — OFFICE VISIT (OUTPATIENT)
Dept: FAMILY MEDICINE CLINIC | Facility: CLINIC | Age: 56
End: 2021-04-07

## 2021-04-07 VITALS
HEART RATE: 81 BPM | BODY MASS INDEX: 41.59 KG/M2 | HEIGHT: 67 IN | WEIGHT: 265 LBS | OXYGEN SATURATION: 94 % | DIASTOLIC BLOOD PRESSURE: 96 MMHG | TEMPERATURE: 97.8 F | SYSTOLIC BLOOD PRESSURE: 153 MMHG

## 2021-04-07 DIAGNOSIS — Z12.31 ENCOUNTER FOR SCREENING MAMMOGRAM FOR BREAST CANCER: ICD-10-CM

## 2021-04-07 DIAGNOSIS — Z00.00 WELLNESS EXAMINATION: Primary | ICD-10-CM

## 2021-04-07 DIAGNOSIS — I10 ESSENTIAL HYPERTENSION: ICD-10-CM

## 2021-04-07 DIAGNOSIS — Z12.11 SCREEN FOR COLON CANCER: ICD-10-CM

## 2021-04-07 PROCEDURE — 83036 HEMOGLOBIN GLYCOSYLATED A1C: CPT | Performed by: FAMILY MEDICINE

## 2021-04-07 PROCEDURE — 84443 ASSAY THYROID STIM HORMONE: CPT | Performed by: FAMILY MEDICINE

## 2021-04-07 PROCEDURE — 80053 COMPREHEN METABOLIC PANEL: CPT | Performed by: FAMILY MEDICINE

## 2021-04-07 PROCEDURE — 99396 PREV VISIT EST AGE 40-64: CPT | Performed by: FAMILY MEDICINE

## 2021-04-07 PROCEDURE — 80061 LIPID PANEL: CPT | Performed by: FAMILY MEDICINE

## 2021-04-07 PROCEDURE — 36415 COLL VENOUS BLD VENIPUNCTURE: CPT | Performed by: FAMILY MEDICINE

## 2021-04-07 PROCEDURE — 85025 COMPLETE CBC W/AUTO DIFF WBC: CPT | Performed by: FAMILY MEDICINE

## 2021-04-07 RX ORDER — MELOXICAM 15 MG/1
15 TABLET ORAL DAILY
Qty: 90 TABLET | Refills: 3 | Status: SHIPPED | OUTPATIENT
Start: 2021-04-07 | End: 2022-04-13

## 2021-04-07 RX ORDER — METOPROLOL SUCCINATE 50 MG/1
50 TABLET, EXTENDED RELEASE ORAL DAILY
Qty: 90 TABLET | Refills: 3 | Status: SHIPPED | OUTPATIENT
Start: 2021-04-07 | End: 2022-04-13

## 2021-04-07 NOTE — PROGRESS NOTES
"Subjective   Racheal Kate is a 55 y.o. female and is here for a comprehensive physical exam. The patient reports problems - no period for over a year.  She is taking otc Estroven. .    Do you take any herbs or supplements that were not prescribed by a doctor? yes, Estroven  Are you taking calcium supplements? no  Are you taking aspirin daily? yes    The following portions of the patient's history were reviewed and updated as appropriate: allergies, current medications, past family history, past medical history, past social history, past surgical history and problem list.    Review of Systems  Do you have pain that bothers you in your daily life? not asked  Pertinent items are noted in HPI.    Objective   /96 (BP Location: Left arm, Patient Position: Sitting, Cuff Size: Adult)   Pulse 81   Temp 97.8 °F (36.6 °C)   Ht 168.9 cm (66.5\")   Wt 120 kg (265 lb)   LMP 02/01/2020 (Exact Date)   SpO2 94%   Breastfeeding No   BMI 42.13 kg/m²   General appearance: alert, appears stated age and cooperative  Head: Normocephalic, without obvious abnormality, atraumatic  Neck: no adenopathy, supple, symmetrical, trachea midline and thyroid not enlarged, symmetric, no tenderness/mass/nodules  Lungs: clear to auscultation bilaterally  Breasts: normal appearance, no masses or tenderness  Heart: regular rate and rhythm, S1, S2 normal, no murmur, click, rub or gallop  Abdomen: soft, non-tender; bowel sounds normal; no masses,  no organomegaly  Pelvic: cervix normal in appearance, external genitalia normal, no adnexal masses or tenderness, no cervical motion tenderness, uterus normal size, shape, and consistency and vagina normal without discharge  Extremities: extremities normal, atraumatic, no cyanosis or edema  Skin: Skin color, texture, turgor normal. No rashes or lesions  Lymph nodes: Cervical, supraclavicular, and axillary nodes normal.  Neurologic: Grossly normal     No visits with results within 1 Week(s) from this " visit.   Latest known visit with results is:   Hospital Outpatient Visit on 07/17/2020   Component Date Value Ref Range Status   • BH CV STRESS PROTOCOL 1 07/17/2020 Pharmacologic   Final   • Stage 1 07/17/2020 1   Final   • Duration Min Stage 1 07/17/2020 0   Final   • Duration Sec Stage 1 07/17/2020 10   Final   • Stress Dose Regadenoson Stage 1 07/17/2020 0.4   Final   • Stress Comments Stage 1 07/17/2020 10 sec bolus injection   Final   • Baseline HR 07/17/2020 78  bpm Final   • Baseline BP 07/17/2020 144/80  mmHg Final   • Recovery HR 07/17/2020 95  bpm Final   • Recovery BP 07/17/2020 140/84  mmHg Final   • Target HR (85%) 07/17/2020 143  bpm Final   • Max. Pred. HR (100%) 07/17/2020 168  bpm Final       Assessment/Plan   Healthy female exam.    Diagnoses and all orders for this visit:    1. Wellness examination (Primary)  -     Mammo Screening Digital Tomosynthesis Bilateral With CAD  -     Ambulatory Referral For Screening Colonoscopy  -     Comprehensive Metabolic Panel  -     Lipid Panel  -     CBC & Differential  -     TSH  -     Hemoglobin A1c    2. Encounter for screening mammogram for breast cancer  -     Mammo Screening Digital Tomosynthesis Bilateral With CAD    3. Screen for colon cancer  -     Ambulatory Referral For Screening Colonoscopy    4. Essential hypertension  -     metoprolol succinate XL (Toprol XL) 50 MG 24 hr tablet; Take 1 tablet by mouth Daily.  Dispense: 90 tablet; Refill: 3    Other orders  -     meloxicam (MOBIC) 15 MG tablet; Take 1 tablet by mouth Daily.  Dispense: 90 tablet; Refill: 3      1. Well exam  2. Patient Counseling:  --Nutrition: Stressed importance of moderation in sodium/caffeine intake, saturated fat and cholesterol, caloric balance, sufficient intake of fresh fruits, vegetables, fiber, calcium, iron  --Discussed the issue of estrogen replacement  --Exercise: Stressed the importance of regular exercise.   --Dental health: Discussed importance of regular tooth  brushing, flossing, and dental visits.  --Immunizations reviewed.  --Discussed benefits of screening colonoscopy.    3. Discussed the patient's BMI with her.  The BMI is above average; BMI management plan is completed  4. Follow up in one year           Answers for HPI/ROS submitted by the patient on 4/5/2021  Please describe your symptoms.: I need to renew my meds and Dr Casas always wants to see me before she does.  Have you had these symptoms before?: Yes  How long have you been having these symptoms?: Greater than 2 weeks  Please list any medications you are currently taking for this condition.: Medicine I need renewed, Levothyroxine, Lovastatin, Metoprolol, Meloxicam  What is the primary reason for your visit?: Other

## 2021-04-08 LAB
ALBUMIN SERPL-MCNC: 4.6 G/DL (ref 3.5–5.2)
ALBUMIN/GLOB SERPL: 1.8 G/DL
ALP SERPL-CCNC: 55 U/L (ref 39–117)
ALT SERPL W P-5'-P-CCNC: 70 U/L (ref 1–33)
ANION GAP SERPL CALCULATED.3IONS-SCNC: 16.3 MMOL/L (ref 5–15)
AST SERPL-CCNC: 59 U/L (ref 1–32)
BASOPHILS # BLD AUTO: 0.05 10*3/MM3 (ref 0–0.2)
BASOPHILS NFR BLD AUTO: 0.9 % (ref 0–1.5)
BILIRUB SERPL-MCNC: 0.5 MG/DL (ref 0–1.2)
BUN SERPL-MCNC: 12 MG/DL (ref 6–20)
BUN/CREAT SERPL: 17.9 (ref 7–25)
CALCIUM SPEC-SCNC: 10.2 MG/DL (ref 8.6–10.5)
CHLORIDE SERPL-SCNC: 99 MMOL/L (ref 98–107)
CHOLEST SERPL-MCNC: 199 MG/DL (ref 0–200)
CO2 SERPL-SCNC: 26.7 MMOL/L (ref 22–29)
CREAT SERPL-MCNC: 0.67 MG/DL (ref 0.57–1)
DEPRECATED RDW RBC AUTO: 43.6 FL (ref 37–54)
EOSINOPHIL # BLD AUTO: 0.14 10*3/MM3 (ref 0–0.4)
EOSINOPHIL NFR BLD AUTO: 2.5 % (ref 0.3–6.2)
ERYTHROCYTE [DISTWIDTH] IN BLOOD BY AUTOMATED COUNT: 13.4 % (ref 12.3–15.4)
GFR SERPL CREATININE-BSD FRML MDRD: 92 ML/MIN/1.73
GLOBULIN UR ELPH-MCNC: 2.6 GM/DL
GLUCOSE SERPL-MCNC: 93 MG/DL (ref 65–99)
HBA1C MFR BLD: 6.5 % (ref 3.5–5.6)
HCT VFR BLD AUTO: 41.8 % (ref 34–46.6)
HDLC SERPL-MCNC: 50 MG/DL (ref 40–60)
HGB BLD-MCNC: 13.6 G/DL (ref 12–15.9)
IMM GRANULOCYTES # BLD AUTO: 0.02 10*3/MM3 (ref 0–0.05)
IMM GRANULOCYTES NFR BLD AUTO: 0.4 % (ref 0–0.5)
LDLC SERPL CALC-MCNC: 102 MG/DL (ref 0–100)
LDLC/HDLC SERPL: 1.87 {RATIO}
LYMPHOCYTES # BLD AUTO: 2.21 10*3/MM3 (ref 0.7–3.1)
LYMPHOCYTES NFR BLD AUTO: 38.8 % (ref 19.6–45.3)
MCH RBC QN AUTO: 29.3 PG (ref 26.6–33)
MCHC RBC AUTO-ENTMCNC: 32.5 G/DL (ref 31.5–35.7)
MCV RBC AUTO: 90.1 FL (ref 79–97)
MONOCYTES # BLD AUTO: 0.42 10*3/MM3 (ref 0.1–0.9)
MONOCYTES NFR BLD AUTO: 7.4 % (ref 5–12)
NEUTROPHILS NFR BLD AUTO: 2.85 10*3/MM3 (ref 1.7–7)
NEUTROPHILS NFR BLD AUTO: 50 % (ref 42.7–76)
NRBC BLD AUTO-RTO: 0 /100 WBC (ref 0–0.2)
PLATELET # BLD AUTO: 266 10*3/MM3 (ref 140–450)
PMV BLD AUTO: 9.8 FL (ref 6–12)
POTASSIUM SERPL-SCNC: 4.2 MMOL/L (ref 3.5–5.2)
PROT SERPL-MCNC: 7.2 G/DL (ref 6–8.5)
RBC # BLD AUTO: 4.64 10*6/MM3 (ref 3.77–5.28)
SODIUM SERPL-SCNC: 142 MMOL/L (ref 136–145)
TRIGL SERPL-MCNC: 278 MG/DL (ref 0–150)
TSH SERPL DL<=0.05 MIU/L-ACNC: 0.79 UIU/ML (ref 0.27–4.2)
VLDLC SERPL-MCNC: 47 MG/DL (ref 5–40)
WBC # BLD AUTO: 5.69 10*3/MM3 (ref 3.4–10.8)

## 2021-04-09 LAB
AGE GDLN ACOG TESTING: NORMAL
CYTOLOGIST CVX/VAG CYTO: NORMAL
CYTOLOGY CVX/VAG DOC CYTO: NORMAL
CYTOLOGY CVX/VAG DOC THIN PREP: NORMAL
DX ICD CODE: NORMAL
HIV 1 & 2 AB SER-IMP: NORMAL
HPV I/H RISK 4 DNA CVX QL PROBE+SIG AMP: NEGATIVE
OTHER STN SPEC: NORMAL
STAT OF ADQ CVX/VAG CYTO-IMP: NORMAL

## 2021-05-14 ENCOUNTER — HOSPITAL ENCOUNTER (OUTPATIENT)
Dept: MAMMOGRAPHY | Facility: HOSPITAL | Age: 56
Discharge: HOME | End: 2021-05-14
Admitting: FAMILY MEDICINE

## 2021-05-14 PROCEDURE — 77067 SCR MAMMO BI INCL CAD: CPT

## 2021-05-14 PROCEDURE — 77063 BREAST TOMOSYNTHESIS BI: CPT

## 2021-06-21 RX ORDER — LEVOTHYROXINE SODIUM 0.1 MG/1
TABLET ORAL
Qty: 90 TABLET | Refills: 0 | Status: SHIPPED | OUTPATIENT
Start: 2021-06-21 | End: 2021-09-20

## 2021-06-21 RX ORDER — LOVASTATIN 20 MG/1
TABLET ORAL
Qty: 90 TABLET | Refills: 0 | Status: SHIPPED | OUTPATIENT
Start: 2021-06-21 | End: 2021-09-20

## 2021-09-20 RX ORDER — LEVOTHYROXINE SODIUM 0.1 MG/1
TABLET ORAL
Qty: 90 TABLET | Refills: 0 | Status: SHIPPED | OUTPATIENT
Start: 2021-09-20 | End: 2021-12-20

## 2021-09-20 RX ORDER — LOVASTATIN 20 MG/1
TABLET ORAL
Qty: 90 TABLET | Refills: 0 | Status: SHIPPED | OUTPATIENT
Start: 2021-09-20 | End: 2021-12-20

## 2021-12-20 RX ORDER — LOVASTATIN 20 MG/1
TABLET ORAL
Qty: 90 TABLET | Refills: 0 | Status: SHIPPED | OUTPATIENT
Start: 2021-12-20 | End: 2022-03-18

## 2021-12-20 RX ORDER — LEVOTHYROXINE SODIUM 0.1 MG/1
TABLET ORAL
Qty: 90 TABLET | Refills: 0 | Status: SHIPPED | OUTPATIENT
Start: 2021-12-20 | End: 2022-03-18

## 2022-03-18 RX ORDER — LOVASTATIN 20 MG/1
TABLET ORAL
Qty: 90 TABLET | Refills: 0 | Status: SHIPPED | OUTPATIENT
Start: 2022-03-18 | End: 2022-06-15 | Stop reason: SDUPTHER

## 2022-03-18 RX ORDER — LEVOTHYROXINE SODIUM 0.1 MG/1
TABLET ORAL
Qty: 90 TABLET | Refills: 0 | Status: SHIPPED | OUTPATIENT
Start: 2022-03-18 | End: 2022-06-15 | Stop reason: SDUPTHER

## 2022-04-12 DIAGNOSIS — I10 ESSENTIAL HYPERTENSION: ICD-10-CM

## 2022-04-13 RX ORDER — METOPROLOL SUCCINATE 50 MG/1
TABLET, EXTENDED RELEASE ORAL
Qty: 90 TABLET | Refills: 0 | Status: SHIPPED | OUTPATIENT
Start: 2022-04-13 | End: 2022-06-15 | Stop reason: SDUPTHER

## 2022-04-13 RX ORDER — MELOXICAM 15 MG/1
TABLET ORAL
Qty: 90 TABLET | Refills: 0 | Status: SHIPPED | OUTPATIENT
Start: 2022-04-13 | End: 2022-06-15 | Stop reason: SDUPTHER

## 2022-04-13 NOTE — TELEPHONE ENCOUNTER
I refilled her medicines but it has been a year since she has been here.  She needs to make an appointment to see me before any more refills.

## 2022-06-15 ENCOUNTER — OFFICE VISIT (OUTPATIENT)
Dept: FAMILY MEDICINE CLINIC | Facility: CLINIC | Age: 57
End: 2022-06-15

## 2022-06-15 VITALS
BODY MASS INDEX: 43.39 KG/M2 | SYSTOLIC BLOOD PRESSURE: 142 MMHG | WEIGHT: 270 LBS | OXYGEN SATURATION: 96 % | TEMPERATURE: 98 F | DIASTOLIC BLOOD PRESSURE: 88 MMHG | HEART RATE: 67 BPM | HEIGHT: 66 IN

## 2022-06-15 DIAGNOSIS — E03.9 HYPOTHYROIDISM, UNSPECIFIED TYPE: ICD-10-CM

## 2022-06-15 DIAGNOSIS — L30.9 DERMATITIS: ICD-10-CM

## 2022-06-15 DIAGNOSIS — I10 ESSENTIAL HYPERTENSION: ICD-10-CM

## 2022-06-15 DIAGNOSIS — Z12.11 SCREEN FOR COLON CANCER: ICD-10-CM

## 2022-06-15 DIAGNOSIS — Z12.31 ENCOUNTER FOR SCREENING MAMMOGRAM FOR BREAST CANCER: ICD-10-CM

## 2022-06-15 DIAGNOSIS — Z00.00 WELLNESS EXAMINATION: Primary | ICD-10-CM

## 2022-06-15 DIAGNOSIS — Z11.59 ENCOUNTER FOR HEPATITIS C SCREENING TEST FOR LOW RISK PATIENT: ICD-10-CM

## 2022-06-15 DIAGNOSIS — Z23 NEED FOR VACCINATION: ICD-10-CM

## 2022-06-15 DIAGNOSIS — E78.5 HYPERLIPIDEMIA, UNSPECIFIED HYPERLIPIDEMIA TYPE: ICD-10-CM

## 2022-06-15 PROCEDURE — 90750 HZV VACC RECOMBINANT IM: CPT | Performed by: FAMILY MEDICINE

## 2022-06-15 PROCEDURE — 99396 PREV VISIT EST AGE 40-64: CPT | Performed by: FAMILY MEDICINE

## 2022-06-15 RX ORDER — METOPROLOL SUCCINATE 50 MG/1
50 TABLET, EXTENDED RELEASE ORAL DAILY
Qty: 90 TABLET | Refills: 3 | Status: SHIPPED | OUTPATIENT
Start: 2022-06-15

## 2022-06-15 RX ORDER — LOVASTATIN 20 MG/1
20 TABLET ORAL
Qty: 90 TABLET | Refills: 3 | Status: SHIPPED | OUTPATIENT
Start: 2022-06-15

## 2022-06-15 RX ORDER — LEVOTHYROXINE SODIUM 0.1 MG/1
100 TABLET ORAL DAILY
Qty: 90 TABLET | Refills: 3 | Status: SHIPPED | OUTPATIENT
Start: 2022-06-15

## 2022-06-15 RX ORDER — MELOXICAM 15 MG/1
15 TABLET ORAL DAILY
Qty: 90 TABLET | Refills: 3 | Status: SHIPPED | OUTPATIENT
Start: 2022-06-15

## 2022-06-15 NOTE — PROGRESS NOTES
"Subjective   Racheal Kate is a 56 y.o. female and is here for a comprehensive physical exam. The patient reports problems - rough spot behing her left ear.    Do you take any herbs or supplements that were not prescribed by a doctor? no  Are you taking calcium supplements? no  Are you taking aspirin daily? no    The following portions of the patient's history were reviewed and updated as appropriate: allergies, current medications, past family history, past medical history, past social history, past surgical history and problem list.    Review of Systems  Do you have pain that bothers you in your daily life? not asked  Pertinent items are noted in HPI.    Objective   /88 (BP Location: Left arm, Patient Position: Sitting, Cuff Size: Large Adult)   Pulse 67   Temp 98 °F (36.7 °C) (Infrared)   Ht 167.6 cm (66\")   Wt 122 kg (270 lb)   LMP 02/15/2020 (Approximate)   SpO2 96%   BMI 43.58 kg/m²   General appearance: alert, appears stated age and cooperative  Head: Normocephalic, without obvious abnormality, atraumatic  Eyes: conjunctivae/corneas clear. PERRL, EOM's intact. Fundi benign.  Ears: normal TM's and external ear canals both ears  Throat: lips, mucosa, and tongue normal; teeth and gums normal  Neck: no adenopathy, no JVD, supple, symmetrical, trachea midline and thyroid not enlarged, symmetric, no tenderness/mass/nodules  Lungs: clear to auscultation bilaterally  Heart: regular rate and rhythm, S1, S2 normal, no murmur, click, rub or gallop  Extremities: extremities normal, atraumatic, no cyanosis or edema  Skin: papular - behind left ear  Lymph nodes: Cervical, supraclavicular, and axillary nodes normal.  Neurologic: Grossly normal     No visits with results within 1 Week(s) from this visit.   Latest known visit with results is:   Office Visit on 04/07/2021   Component Date Value Ref Range Status   • Glucose 04/07/2021 93  65 - 99 mg/dL Final   • BUN 04/07/2021 12  6 - 20 mg/dL Final   • Creatinine " 04/07/2021 0.67  0.57 - 1.00 mg/dL Final   • Sodium 04/07/2021 142  136 - 145 mmol/L Final   • Potassium 04/07/2021 4.2  3.5 - 5.2 mmol/L Final   • Chloride 04/07/2021 99  98 - 107 mmol/L Final   • CO2 04/07/2021 26.7  22.0 - 29.0 mmol/L Final   • Calcium 04/07/2021 10.2  8.6 - 10.5 mg/dL Final   • Total Protein 04/07/2021 7.2  6.0 - 8.5 g/dL Final   • Albumin 04/07/2021 4.60  3.50 - 5.20 g/dL Final   • ALT (SGPT) 04/07/2021 70 (A) 1 - 33 U/L Final   • AST (SGOT) 04/07/2021 59 (A) 1 - 32 U/L Final   • Alkaline Phosphatase 04/07/2021 55  39 - 117 U/L Final   • Total Bilirubin 04/07/2021 0.5  0.0 - 1.2 mg/dL Final   • eGFR Non African Amer 04/07/2021 92  >60 mL/min/1.73 Final   • Globulin 04/07/2021 2.6  gm/dL Final   • A/G Ratio 04/07/2021 1.8  g/dL Final   • BUN/Creatinine Ratio 04/07/2021 17.9  7.0 - 25.0 Final   • Anion Gap 04/07/2021 16.3 (A) 5.0 - 15.0 mmol/L Final   • Total Cholesterol 04/07/2021 199  0 - 200 mg/dL Final   • Triglycerides 04/07/2021 278 (A) 0 - 150 mg/dL Final   • HDL Cholesterol 04/07/2021 50  40 - 60 mg/dL Final   • LDL Cholesterol  04/07/2021 102 (A) 0 - 100 mg/dL Final   • VLDL Cholesterol 04/07/2021 47 (A) 5 - 40 mg/dL Final   • LDL/HDL Ratio 04/07/2021 1.87   Final   • TSH 04/07/2021 0.792  0.270 - 4.200 uIU/mL Final   • Hemoglobin A1C 04/07/2021 6.5 (A) 3.5 - 5.6 % Final   • WBC 04/07/2021 5.69  3.40 - 10.80 10*3/mm3 Final   • RBC 04/07/2021 4.64  3.77 - 5.28 10*6/mm3 Final   • Hemoglobin 04/07/2021 13.6  12.0 - 15.9 g/dL Final   • Hematocrit 04/07/2021 41.8  34.0 - 46.6 % Final   • MCV 04/07/2021 90.1  79.0 - 97.0 fL Final   • MCH 04/07/2021 29.3  26.6 - 33.0 pg Final   • MCHC 04/07/2021 32.5  31.5 - 35.7 g/dL Final   • RDW 04/07/2021 13.4  12.3 - 15.4 % Final   • RDW-SD 04/07/2021 43.6  37.0 - 54.0 fl Final   • MPV 04/07/2021 9.8  6.0 - 12.0 fL Final   • Platelets 04/07/2021 266  140 - 450 10*3/mm3 Final   • Neutrophil % 04/07/2021 50.0  42.7 - 76.0 % Final   • Lymphocyte % 04/07/2021  38.8  19.6 - 45.3 % Final   • Monocyte % 04/07/2021 7.4  5.0 - 12.0 % Final   • Eosinophil % 04/07/2021 2.5  0.3 - 6.2 % Final   • Basophil % 04/07/2021 0.9  0.0 - 1.5 % Final   • Immature Grans % 04/07/2021 0.4  0.0 - 0.5 % Final   • Neutrophils, Absolute 04/07/2021 2.85  1.70 - 7.00 10*3/mm3 Final   • Lymphocytes, Absolute 04/07/2021 2.21  0.70 - 3.10 10*3/mm3 Final   • Monocytes, Absolute 04/07/2021 0.42  0.10 - 0.90 10*3/mm3 Final   • Eosinophils, Absolute 04/07/2021 0.14  0.00 - 0.40 10*3/mm3 Final   • Basophils, Absolute 04/07/2021 0.05  0.00 - 0.20 10*3/mm3 Final   • Immature Grans, Absolute 04/07/2021 0.02  0.00 - 0.05 10*3/mm3 Final   • nRBC 04/07/2021 0.0  0.0 - 0.2 /100 WBC Final   • Age Gdln ACOG Testing 04/07/2021 30-65   Final   • Diagnosis 04/07/2021 Comment   Final    NEGATIVE FOR INTRAEPITHELIAL LESION OR MALIGNANCY.   • Specimen adequacy: 04/07/2021 Comment   Final    Satisfactory for evaluation.  Endocervical and/or squamous metaplastic  cells (endocervical component) are present.   • Clinician Provided ICD-10: 04/07/2021 Comment   Final    Z00.00   • Performed by: 04/07/2021 Comment   Final    Stephania Ca, Cytotechnologist (ASCP)   • . 04/07/2021 .   Final   • Note: 04/07/2021 Comment   Final    The Pap smear is a screening test designed to aid in the detection of  premalignant and malignant conditions of the uterine cervix.  It is not a  diagnostic procedure and should not be used as the sole means of detecting  cervical cancer.  Both false-positive and false-negative reports do occur.   • Method: 04/07/2021 Comment   Final    This liquid based ThinPrep(R) pap test was screened with the  use of an image guided system.   • HPV Aptima 04/07/2021 Negative  Negative Final    This nucleic acid amplification test detects fourteen high-risk  HPV types (16,18,31,33,35,39,45,51,52,56,58,59,66,68) without  differentiation.       Assessment & Plan   Healthy female exam.  Diagnoses and all orders for  this visit:    1. Wellness examination (Primary)  -     CBC & Differential  -     Comprehensive Metabolic Panel  -     TSH  -     Lipid Panel  -     Hepatitis C antibody; Future  -     Cologuard - Stool, Per Rectum; Future  -     Mammo Screening Digital Tomosynthesis Bilateral With CAD    2. Hyperlipidemia, unspecified hyperlipidemia type  -     CBC & Differential  -     Comprehensive Metabolic Panel  -     TSH  -     Lipid Panel  -     lovastatin (MEVACOR) 20 MG tablet; Take 1 tablet by mouth every night at bedtime.  Dispense: 90 tablet; Refill: 3    3. Essential hypertension  -     CBC & Differential  -     Comprehensive Metabolic Panel  -     TSH  -     Lipid Panel  -     metoprolol succinate XL (TOPROL-XL) 50 MG 24 hr tablet; Take 1 tablet by mouth Daily.  Dispense: 90 tablet; Refill: 3    4. Hypothyroidism, unspecified type  -     CBC & Differential  -     Comprehensive Metabolic Panel  -     TSH  -     Lipid Panel  -     levothyroxine (SYNTHROID, LEVOTHROID) 100 MCG tablet; Take 1 tablet by mouth Daily.  Dispense: 90 tablet; Refill: 3    5. Encounter for hepatitis C screening test for low risk patient  -     Hepatitis C antibody; Future    6. Screen for colon cancer  -     Cologuard - Stool, Per Rectum; Future    7. Encounter for screening mammogram for breast cancer  -     Mammo Screening Digital Tomosynthesis Bilateral With CAD    8. Dermatitis  -     Ambulatory Referral to Dermatology    9. Need for vaccination  -     Shingrix Vaccine    Other orders  -     meloxicam (MOBIC) 15 MG tablet; Take 1 tablet by mouth Daily.  Dispense: 90 tablet; Refill: 3      1. Well exam.  2. Patient Counseling:  --Nutrition: Stressed importance of moderation in sodium/caffeine intake, saturated fat and cholesterol, caloric balance, sufficient intake of fresh fruits, vegetables, fiber, calcium, iron  --Exercise: Stressed the importance of regular exercise.   --Dental health: Discussed importance of regular tooth brushing,  flossing, and dental visits.  --Immunizations reviewed.  --Discussed benefits of screening colonoscopy.  --After hours service discussed with patient    3. Discussed the patient's BMI with her.  The BMI is above average; BMI management plan is completed  4. Follow up in one year

## 2022-06-16 ENCOUNTER — LAB (OUTPATIENT)
Dept: LAB | Facility: HOSPITAL | Age: 57
End: 2022-06-16

## 2022-06-16 DIAGNOSIS — Z00.00 WELLNESS EXAMINATION: ICD-10-CM

## 2022-06-16 DIAGNOSIS — Z11.59 ENCOUNTER FOR HEPATITIS C SCREENING TEST FOR LOW RISK PATIENT: ICD-10-CM

## 2022-06-16 LAB
ALBUMIN SERPL-MCNC: 4.7 G/DL (ref 3.5–5.2)
ALBUMIN/GLOB SERPL: 1.8 G/DL
ALP SERPL-CCNC: 67 U/L (ref 39–117)
ALT SERPL W P-5'-P-CCNC: 59 U/L (ref 1–33)
ANION GAP SERPL CALCULATED.3IONS-SCNC: 12 MMOL/L (ref 5–15)
AST SERPL-CCNC: 50 U/L (ref 1–32)
BASOPHILS # BLD AUTO: 0.04 10*3/MM3 (ref 0–0.2)
BASOPHILS NFR BLD AUTO: 0.9 % (ref 0–1.5)
BILIRUB SERPL-MCNC: 0.6 MG/DL (ref 0–1.2)
BUN SERPL-MCNC: 13 MG/DL (ref 6–20)
BUN/CREAT SERPL: 14.8 (ref 7–25)
CALCIUM SPEC-SCNC: 10 MG/DL (ref 8.6–10.5)
CHLORIDE SERPL-SCNC: 101 MMOL/L (ref 98–107)
CHOLEST SERPL-MCNC: 183 MG/DL (ref 0–200)
CO2 SERPL-SCNC: 27 MMOL/L (ref 22–29)
CREAT SERPL-MCNC: 0.88 MG/DL (ref 0.57–1)
DEPRECATED RDW RBC AUTO: 43.9 FL (ref 37–54)
EGFRCR SERPLBLD CKD-EPI 2021: 78.2 ML/MIN/1.73
EOSINOPHIL # BLD AUTO: 0.16 10*3/MM3 (ref 0–0.4)
EOSINOPHIL NFR BLD AUTO: 3.4 % (ref 0.3–6.2)
ERYTHROCYTE [DISTWIDTH] IN BLOOD BY AUTOMATED COUNT: 13.4 % (ref 12.3–15.4)
GLOBULIN UR ELPH-MCNC: 2.6 GM/DL
GLUCOSE SERPL-MCNC: 141 MG/DL (ref 65–99)
HCT VFR BLD AUTO: 38.4 % (ref 34–46.6)
HCV AB SER DONR QL: NORMAL
HDLC SERPL-MCNC: 46 MG/DL (ref 40–60)
HGB BLD-MCNC: 12.7 G/DL (ref 12–15.9)
IMM GRANULOCYTES # BLD AUTO: 0.01 10*3/MM3 (ref 0–0.05)
IMM GRANULOCYTES NFR BLD AUTO: 0.2 % (ref 0–0.5)
LDLC SERPL CALC-MCNC: 106 MG/DL (ref 0–100)
LDLC/HDLC SERPL: 2.19 {RATIO}
LYMPHOCYTES # BLD AUTO: 0.81 10*3/MM3 (ref 0.7–3.1)
LYMPHOCYTES NFR BLD AUTO: 17.2 % (ref 19.6–45.3)
MCH RBC QN AUTO: 30 PG (ref 26.6–33)
MCHC RBC AUTO-ENTMCNC: 33.1 G/DL (ref 31.5–35.7)
MCV RBC AUTO: 90.6 FL (ref 79–97)
MONOCYTES # BLD AUTO: 0.36 10*3/MM3 (ref 0.1–0.9)
MONOCYTES NFR BLD AUTO: 7.7 % (ref 5–12)
NEUTROPHILS NFR BLD AUTO: 3.32 10*3/MM3 (ref 1.7–7)
NEUTROPHILS NFR BLD AUTO: 70.6 % (ref 42.7–76)
NRBC BLD AUTO-RTO: 0 /100 WBC (ref 0–0.2)
PLATELET # BLD AUTO: 207 10*3/MM3 (ref 140–450)
PMV BLD AUTO: 9.6 FL (ref 6–12)
POTASSIUM SERPL-SCNC: 4.6 MMOL/L (ref 3.5–5.2)
PROT SERPL-MCNC: 7.3 G/DL (ref 6–8.5)
RBC # BLD AUTO: 4.24 10*6/MM3 (ref 3.77–5.28)
SODIUM SERPL-SCNC: 140 MMOL/L (ref 136–145)
TRIGL SERPL-MCNC: 181 MG/DL (ref 0–150)
TSH SERPL DL<=0.05 MIU/L-ACNC: 2.46 UIU/ML (ref 0.27–4.2)
VLDLC SERPL-MCNC: 31 MG/DL (ref 5–40)
WBC NRBC COR # BLD: 4.7 10*3/MM3 (ref 3.4–10.8)

## 2022-06-16 PROCEDURE — 86803 HEPATITIS C AB TEST: CPT

## 2022-06-16 PROCEDURE — 36415 COLL VENOUS BLD VENIPUNCTURE: CPT

## 2022-06-16 PROCEDURE — 80061 LIPID PANEL: CPT | Performed by: FAMILY MEDICINE

## 2022-06-16 PROCEDURE — 80050 GENERAL HEALTH PANEL: CPT | Performed by: FAMILY MEDICINE

## 2022-06-29 ENCOUNTER — HOSPITAL ENCOUNTER (OUTPATIENT)
Dept: MAMMOGRAPHY | Facility: HOSPITAL | Age: 57
Discharge: HOME | End: 2022-06-29
Admitting: FAMILY MEDICINE

## 2022-06-29 PROCEDURE — 77063 BREAST TOMOSYNTHESIS BI: CPT

## 2022-06-29 PROCEDURE — 77067 SCR MAMMO BI INCL CAD: CPT

## 2022-08-12 DIAGNOSIS — R73.9 HYPERGLYCEMIA: Primary | ICD-10-CM

## 2022-08-12 DIAGNOSIS — R74.8 ELEVATED LIVER ENZYMES: ICD-10-CM

## 2022-08-15 ENCOUNTER — CLINICAL SUPPORT (OUTPATIENT)
Dept: FAMILY MEDICINE CLINIC | Facility: CLINIC | Age: 57
End: 2022-08-15

## 2022-08-15 DIAGNOSIS — Z23 NEED FOR VACCINATION: Primary | ICD-10-CM

## 2022-08-15 PROCEDURE — 90750 HZV VACC RECOMBINANT IM: CPT | Performed by: FAMILY MEDICINE

## 2022-09-07 ENCOUNTER — OFFICE VISIT (OUTPATIENT)
Dept: FAMILY MEDICINE CLINIC | Facility: CLINIC | Age: 57
End: 2022-09-07

## 2022-09-07 VITALS
BODY MASS INDEX: 41.32 KG/M2 | SYSTOLIC BLOOD PRESSURE: 145 MMHG | WEIGHT: 256 LBS | HEART RATE: 77 BPM | DIASTOLIC BLOOD PRESSURE: 91 MMHG | TEMPERATURE: 97.8 F | OXYGEN SATURATION: 97 %

## 2022-09-07 DIAGNOSIS — R73.9 HYPERGLYCEMIA: ICD-10-CM

## 2022-09-07 DIAGNOSIS — H61.23 BILATERAL IMPACTED CERUMEN: ICD-10-CM

## 2022-09-07 DIAGNOSIS — H66.002 NON-RECURRENT ACUTE SUPPURATIVE OTITIS MEDIA OF LEFT EAR WITHOUT SPONTANEOUS RUPTURE OF TYMPANIC MEMBRANE: Primary | ICD-10-CM

## 2022-09-07 PROCEDURE — 99213 OFFICE O/P EST LOW 20 MIN: CPT | Performed by: FAMILY MEDICINE

## 2022-09-07 RX ORDER — AZITHROMYCIN 250 MG/1
TABLET, FILM COATED ORAL
Qty: 6 TABLET | Refills: 0 | Status: SHIPPED | OUTPATIENT
Start: 2022-09-07

## 2022-09-07 NOTE — PROGRESS NOTES
"Answers for HPI/ROS submitted by the patient on 9/6/2022  Please describe your symptoms.: Have had an earache and sinus headache since Friday 9/2. Been taking Tylenol Sinus and headache and it relieves the pressure/pain, but my ear is still blocked (unable to hear out of it as if I have water in it).  Have you had these symptoms before?: No  How long have you been having these symptoms?: 1-4 days  Please list any medications you are currently taking for this condition.: Tylenol sinus and headache  What is the primary reason for your visit?: Other    Chief Complaint  Headache (Started 09-01- pressure behind eyes ) and Earache    Subjective        Racheal Kate presents to [facility]  Earache   There is pain in the left ear. This is a new problem. The current episode started in the past 7 days. The problem has been unchanged. There has been no fever. Associated symptoms include headaches and rhinorrhea. Pertinent negatives include no coughing or ear discharge. She has tried acetaminophen for the symptoms.       Objective   Vital Signs:  /91 (BP Location: Right arm, Patient Position: Sitting, Cuff Size: Large Adult)   Pulse 77   Temp 97.8 °F (36.6 °C) (Infrared)   Wt 116 kg (256 lb)   SpO2 97%   BMI 41.32 kg/m²   Estimated body mass index is 41.32 kg/m² as calculated from the following:    Height as of 6/15/22: 167.6 cm (66\").    Weight as of this encounter: 116 kg (256 lb).          Physical Exam  Vitals and nursing note reviewed.   Constitutional:       Appearance: Normal appearance.   HENT:      Head: Normocephalic and atraumatic.      Left Ear: There is impacted cerumen. Tympanic membrane is erythematous.   Cardiovascular:      Rate and Rhythm: Regular rhythm.      Heart sounds: Normal heart sounds.   Musculoskeletal:      Cervical back: Neck supple.   Skin:     General: Skin is warm.   Neurological:      Mental Status: She is alert.        Result Review :  The following " data was reviewed by: Kelly Casas MD on 09/07/2022:  Common labs    Common Labs 6/16/22 6/16/22 6/16/22 9/23/22 9/23/22 9/23/22    0810 0810 0810 [id number]   Glucose  141 (A)    107 (A)   BUN  13    18   Creatinine  0.88    0.78   Sodium  140    141   Potassium  4.6    4.5   Chloride  101    104   Calcium  10.0    9.4   Albumin  4.70    4.50   Total Bilirubin  0.6    0.5   Alkaline Phosphatase  67    63   AST (SGOT)  50 (A)    25   ALT (SGPT)  59 (A)    21   WBC 4.70        Hemoglobin 12.7        Hematocrit 38.4        Platelets 207        Total Cholesterol   183  189    Triglycerides   181 (A)  241 (A)    HDL Cholesterol   46  43    LDL Cholesterol    106 (A)  105 (A)    Hemoglobin A1C    6.1 (A)     (A) Abnormal value       Comments are available for some flowsheets but are not being displayed.                     Assessment and Plan   Diagnoses and all orders for this visit:    1. Non-recurrent acute suppurative otitis media of left ear without spontaneous rupture of tympanic membrane (Primary)  -     azithromycin (Zithromax Z-Devante) 250 MG tablet; Take 2 tablets the first day, then 1 tablet daily for 4 days.  Dispense: 6 tablet; Refill: 0    2. Bilateral impacted cerumen    3. Hyperglycemia  -     Lipid Panel  -     Cancel: Comprehensive Metabolic Panel  -     Cancel: Hemoglobin A1c             Follow Up   No follow-ups on file.  Patient was given instructions and counseling regarding her condition or for health maintenance advice. Please see specific information pulled into the AVS if appropriate.

## 2022-09-14 ENCOUNTER — PATIENT MESSAGE (OUTPATIENT)
Dept: FAMILY MEDICINE CLINIC | Facility: CLINIC | Age: 57
End: 2022-09-14

## 2022-09-14 RX ORDER — PREDNISONE 10 MG/1
10 TABLET ORAL 2 TIMES DAILY
Qty: 10 TABLET | Refills: 0 | Status: SHIPPED | OUTPATIENT
Start: 2022-09-14

## 2022-09-23 ENCOUNTER — LAB (OUTPATIENT)
Dept: LAB | Facility: HOSPITAL | Age: 57
End: 2022-09-23

## 2022-09-23 LAB
ALBUMIN SERPL-MCNC: 4.5 G/DL (ref 3.5–5.2)
ALBUMIN/GLOB SERPL: 2 G/DL
ALP SERPL-CCNC: 63 U/L (ref 39–117)
ALT SERPL W P-5'-P-CCNC: 21 U/L (ref 1–33)
ANION GAP SERPL CALCULATED.3IONS-SCNC: 11 MMOL/L (ref 5–15)
AST SERPL-CCNC: 25 U/L (ref 1–32)
BILIRUB SERPL-MCNC: 0.5 MG/DL (ref 0–1.2)
BUN SERPL-MCNC: 18 MG/DL (ref 6–20)
BUN/CREAT SERPL: 23.1 (ref 7–25)
CALCIUM SPEC-SCNC: 9.4 MG/DL (ref 8.6–10.5)
CHLORIDE SERPL-SCNC: 104 MMOL/L (ref 98–107)
CHOLEST SERPL-MCNC: 189 MG/DL (ref 0–200)
CO2 SERPL-SCNC: 26 MMOL/L (ref 22–29)
CREAT SERPL-MCNC: 0.78 MG/DL (ref 0.57–1)
EGFRCR SERPLBLD CKD-EPI 2021: 90.4 ML/MIN/1.73
GLOBULIN UR ELPH-MCNC: 2.3 GM/DL
GLUCOSE SERPL-MCNC: 107 MG/DL (ref 65–99)
HBA1C MFR BLD: 6.1 % (ref 3.5–5.6)
HDLC SERPL-MCNC: 43 MG/DL (ref 40–60)
LDLC SERPL CALC-MCNC: 105 MG/DL (ref 0–100)
LDLC/HDLC SERPL: 2.27 {RATIO}
POTASSIUM SERPL-SCNC: 4.5 MMOL/L (ref 3.5–5.2)
PROT SERPL-MCNC: 6.8 G/DL (ref 6–8.5)
SODIUM SERPL-SCNC: 141 MMOL/L (ref 136–145)
TRIGL SERPL-MCNC: 241 MG/DL (ref 0–150)
VLDLC SERPL-MCNC: 41 MG/DL (ref 5–40)

## 2022-09-23 PROCEDURE — 80061 LIPID PANEL: CPT | Performed by: FAMILY MEDICINE

## 2022-09-23 PROCEDURE — 80053 COMPREHEN METABOLIC PANEL: CPT | Performed by: FAMILY MEDICINE

## 2022-09-23 PROCEDURE — 36415 COLL VENOUS BLD VENIPUNCTURE: CPT | Performed by: FAMILY MEDICINE

## 2022-09-23 PROCEDURE — 83036 HEMOGLOBIN GLYCOSYLATED A1C: CPT | Performed by: FAMILY MEDICINE

## 2023-06-14 DIAGNOSIS — E78.5 HYPERLIPIDEMIA, UNSPECIFIED HYPERLIPIDEMIA TYPE: ICD-10-CM

## 2023-06-14 DIAGNOSIS — I10 ESSENTIAL HYPERTENSION: ICD-10-CM

## 2023-06-14 DIAGNOSIS — E03.9 HYPOTHYROIDISM, UNSPECIFIED TYPE: ICD-10-CM

## 2023-06-16 RX ORDER — MELOXICAM 15 MG/1
TABLET ORAL
Qty: 90 TABLET | Refills: 0 | Status: SHIPPED | OUTPATIENT
Start: 2023-06-16

## 2023-06-16 RX ORDER — LEVOTHYROXINE SODIUM 0.1 MG/1
TABLET ORAL
Qty: 90 TABLET | Refills: 0 | Status: SHIPPED | OUTPATIENT
Start: 2023-06-16

## 2023-06-16 RX ORDER — METOPROLOL SUCCINATE 50 MG/1
TABLET, EXTENDED RELEASE ORAL
Qty: 90 TABLET | Refills: 0 | Status: SHIPPED | OUTPATIENT
Start: 2023-06-16

## 2023-06-16 RX ORDER — LOVASTATIN 20 MG/1
TABLET ORAL
Qty: 90 TABLET | Refills: 0 | Status: SHIPPED | OUTPATIENT
Start: 2023-06-16

## 2023-08-02 ENCOUNTER — OFFICE VISIT (OUTPATIENT)
Dept: FAMILY MEDICINE CLINIC | Facility: CLINIC | Age: 58
End: 2023-08-02
Payer: COMMERCIAL

## 2023-08-02 ENCOUNTER — LAB (OUTPATIENT)
Dept: FAMILY MEDICINE CLINIC | Facility: CLINIC | Age: 58
End: 2023-08-02
Payer: COMMERCIAL

## 2023-08-02 VITALS
DIASTOLIC BLOOD PRESSURE: 84 MMHG | OXYGEN SATURATION: 96 % | WEIGHT: 265.8 LBS | SYSTOLIC BLOOD PRESSURE: 138 MMHG | HEIGHT: 68 IN | HEART RATE: 69 BPM | TEMPERATURE: 97.3 F | BODY MASS INDEX: 40.28 KG/M2

## 2023-08-02 DIAGNOSIS — Z23 NEED FOR VACCINATION: ICD-10-CM

## 2023-08-02 DIAGNOSIS — E66.01 MORBID (SEVERE) OBESITY DUE TO EXCESS CALORIES: ICD-10-CM

## 2023-08-02 DIAGNOSIS — Z00.00 WELLNESS EXAMINATION: Primary | ICD-10-CM

## 2023-08-02 DIAGNOSIS — Z12.31 ENCOUNTER FOR SCREENING MAMMOGRAM FOR BREAST CANCER: ICD-10-CM

## 2023-08-02 LAB
ALBUMIN SERPL-MCNC: 4.4 G/DL (ref 3.5–5.2)
ALBUMIN UR-MCNC: 2 MG/DL
ALBUMIN/GLOB SERPL: 1.7 G/DL
ALP SERPL-CCNC: 71 U/L (ref 39–117)
ALT SERPL W P-5'-P-CCNC: 56 U/L (ref 1–33)
ANION GAP SERPL CALCULATED.3IONS-SCNC: 13.3 MMOL/L (ref 5–15)
AST SERPL-CCNC: 48 U/L (ref 1–32)
BILIRUB SERPL-MCNC: 0.3 MG/DL (ref 0–1.2)
BUN SERPL-MCNC: 14 MG/DL (ref 6–20)
BUN/CREAT SERPL: 18.7 (ref 7–25)
CALCIUM SPEC-SCNC: 9.8 MG/DL (ref 8.6–10.5)
CHLORIDE SERPL-SCNC: 104 MMOL/L (ref 98–107)
CHOLEST SERPL-MCNC: 190 MG/DL (ref 0–200)
CO2 SERPL-SCNC: 25.7 MMOL/L (ref 22–29)
CREAT SERPL-MCNC: 0.75 MG/DL (ref 0.57–1)
CREAT UR-MCNC: 149.9 MG/DL
EGFRCR SERPLBLD CKD-EPI 2021: 94.2 ML/MIN/1.73
GLOBULIN UR ELPH-MCNC: 2.6 GM/DL
GLUCOSE SERPL-MCNC: 160 MG/DL (ref 65–99)
HBA1C MFR BLD: 7.9 % (ref 4.8–5.6)
HDLC SERPL-MCNC: 45 MG/DL (ref 40–60)
LDLC SERPL CALC-MCNC: 101 MG/DL (ref 0–100)
LDLC/HDLC SERPL: 2.08 {RATIO}
MICROALBUMIN/CREAT UR: 13.3 MG/G
POTASSIUM SERPL-SCNC: 4.6 MMOL/L (ref 3.5–5.2)
PROT SERPL-MCNC: 7 G/DL (ref 6–8.5)
SODIUM SERPL-SCNC: 143 MMOL/L (ref 136–145)
TRIGL SERPL-MCNC: 256 MG/DL (ref 0–150)
TSH SERPL DL<=0.05 MIU/L-ACNC: 1.46 UIU/ML (ref 0.27–4.2)
VLDLC SERPL-MCNC: 44 MG/DL (ref 5–40)

## 2023-08-02 PROCEDURE — 80053 COMPREHEN METABOLIC PANEL: CPT | Performed by: FAMILY MEDICINE

## 2023-08-02 PROCEDURE — 36415 COLL VENOUS BLD VENIPUNCTURE: CPT | Performed by: FAMILY MEDICINE

## 2023-08-02 PROCEDURE — 83036 HEMOGLOBIN GLYCOSYLATED A1C: CPT | Performed by: FAMILY MEDICINE

## 2023-08-02 PROCEDURE — 80061 LIPID PANEL: CPT | Performed by: FAMILY MEDICINE

## 2023-08-02 PROCEDURE — 82043 UR ALBUMIN QUANTITATIVE: CPT | Performed by: FAMILY MEDICINE

## 2023-08-02 PROCEDURE — 84443 ASSAY THYROID STIM HORMONE: CPT | Performed by: FAMILY MEDICINE

## 2023-08-02 PROCEDURE — 82570 ASSAY OF URINE CREATININE: CPT | Performed by: FAMILY MEDICINE

## 2023-08-02 RX ORDER — FOLIC ACID 1 MG/1
1 TABLET ORAL DAILY
COMMUNITY

## 2023-08-02 RX ORDER — CHLORAL HYDRATE 500 MG
1000 CAPSULE ORAL
COMMUNITY

## 2023-08-02 RX ORDER — THIAMINE HCL 100 MG
1 TABLET ORAL DAILY
COMMUNITY

## 2023-08-04 ENCOUNTER — TELEPHONE (OUTPATIENT)
Dept: FAMILY MEDICINE CLINIC | Facility: CLINIC | Age: 58
End: 2023-08-04

## 2023-08-04 DIAGNOSIS — E11.65 TYPE 2 DIABETES MELLITUS WITH HYPERGLYCEMIA, WITHOUT LONG-TERM CURRENT USE OF INSULIN: Primary | ICD-10-CM

## 2023-08-04 RX ORDER — SEMAGLUTIDE 1.34 MG/ML
0.25 INJECTION, SOLUTION SUBCUTANEOUS WEEKLY
Qty: 1.5 ML | Refills: 0 | Status: SHIPPED | OUTPATIENT
Start: 2023-08-04 | End: 2023-08-08

## 2023-08-04 NOTE — TELEPHONE ENCOUNTER
Caller: Racheal Kate    Relationship to patient: Self    Best call back number:    [phone] (Mobile)       Patient is needing: PATIENT RETURNED CALL

## 2023-08-04 NOTE — TELEPHONE ENCOUNTER
Pt was verbally informed of her lab results she was not able to start on Wegovy but she is interested in starting Ozempic and would like that to [facility]

## 2023-08-08 ENCOUNTER — TELEPHONE (OUTPATIENT)
Dept: FAMILY MEDICINE CLINIC | Facility: CLINIC | Age: 58
End: 2023-08-08
Payer: COMMERCIAL

## 2023-08-08 RX ORDER — SEMAGLUTIDE 0.68 MG/ML
0.25 INJECTION, SOLUTION SUBCUTANEOUS WEEKLY
Qty: 3 ML | Refills: 1 | Status: SHIPPED | OUTPATIENT
Start: 2023-08-08

## 2023-08-08 NOTE — ADDENDUM NOTE
Addended by: TD LEAL on: 8/8/2023 04:19 PM     Modules accepted: Orders    
Constitutional: no fevers no chills.   CV: no chest pain,   Respiratory: no shortness of breath,   GI: no abdominal pain, no nausea no vomiting

## 2023-08-08 NOTE — TELEPHONE ENCOUNTER
[facility] called and left a vm stating the ordering for the Ozempic has changed and she needs a new rx asking for the 2 mg per 3 mL

## 2023-08-14 ENCOUNTER — HOSPITAL ENCOUNTER (OUTPATIENT)
Dept: MAMMOGRAPHY | Facility: HOSPITAL | Age: 58
Discharge: HOME | End: 2023-08-14
Admitting: FAMILY MEDICINE
Payer: COMMERCIAL

## 2023-08-14 PROCEDURE — 77063 BREAST TOMOSYNTHESIS BI: CPT

## 2023-08-14 PROCEDURE — 77067 SCR MAMMO BI INCL CAD: CPT

## 2023-09-14 ENCOUNTER — PATIENT MESSAGE (OUTPATIENT)
Dept: FAMILY MEDICINE CLINIC | Facility: CLINIC | Age: 58
End: 2023-09-14

## 2023-09-16 DIAGNOSIS — E03.9 HYPOTHYROIDISM, UNSPECIFIED TYPE: ICD-10-CM

## 2023-09-16 DIAGNOSIS — E78.5 HYPERLIPIDEMIA, UNSPECIFIED HYPERLIPIDEMIA TYPE: ICD-10-CM

## 2023-09-18 RX ORDER — LEVOTHYROXINE SODIUM 0.1 MG/1
TABLET ORAL
Qty: 90 TABLET | Refills: 1 | Status: SHIPPED | OUTPATIENT
Start: 2023-09-18

## 2023-09-18 RX ORDER — LOVASTATIN 20 MG/1
TABLET ORAL
Qty: 90 TABLET | Refills: 1 | Status: SHIPPED | OUTPATIENT
Start: 2023-09-18

## 2023-09-23 DIAGNOSIS — I10 ESSENTIAL HYPERTENSION: ICD-10-CM

## 2023-09-25 RX ORDER — METOPROLOL SUCCINATE 50 MG/1
TABLET, EXTENDED RELEASE ORAL
Qty: 90 TABLET | Refills: 1 | Status: SHIPPED | OUTPATIENT
Start: 2023-09-25

## 2023-09-25 RX ORDER — MELOXICAM 15 MG/1
TABLET ORAL
Qty: 90 TABLET | Refills: 1 | Status: SHIPPED | OUTPATIENT
Start: 2023-09-25

## 2023-09-29 RX ORDER — SEMAGLUTIDE 0.68 MG/ML
0.5 INJECTION, SOLUTION SUBCUTANEOUS WEEKLY
Qty: 3 ML | Refills: 1 | Status: SHIPPED | OUTPATIENT
Start: 2023-09-29

## 2023-11-01 RX ORDER — SEMAGLUTIDE 0.68 MG/ML
0.5 INJECTION, SOLUTION SUBCUTANEOUS WEEKLY
Qty: 9 ML | Refills: 0 | Status: SHIPPED | OUTPATIENT
Start: 2023-11-01

## 2023-11-07 RX ORDER — SEMAGLUTIDE 0.68 MG/ML
0.5 INJECTION, SOLUTION SUBCUTANEOUS WEEKLY
Qty: 3 ML | Refills: 0 | Status: SHIPPED | OUTPATIENT
Start: 2023-11-07

## 2024-01-26 RX ORDER — SEMAGLUTIDE 0.68 MG/ML
INJECTION, SOLUTION SUBCUTANEOUS
Qty: 3 ML | Refills: 0 | Status: SHIPPED | OUTPATIENT
Start: 2024-01-26

## 2024-02-28 ENCOUNTER — PATIENT MESSAGE (OUTPATIENT)
Dept: FAMILY MEDICINE CLINIC | Facility: CLINIC | Age: 59
End: 2024-02-28
Payer: COMMERCIAL

## 2024-03-04 ENCOUNTER — OFFICE VISIT (OUTPATIENT)
Dept: FAMILY MEDICINE CLINIC | Facility: CLINIC | Age: 59
End: 2024-03-04
Payer: COMMERCIAL

## 2024-03-04 ENCOUNTER — LAB (OUTPATIENT)
Dept: FAMILY MEDICINE CLINIC | Facility: CLINIC | Age: 59
End: 2024-03-04
Payer: COMMERCIAL

## 2024-03-04 VITALS
RESPIRATION RATE: 16 BRPM | DIASTOLIC BLOOD PRESSURE: 84 MMHG | WEIGHT: 250.4 LBS | TEMPERATURE: 97.7 F | OXYGEN SATURATION: 97 % | SYSTOLIC BLOOD PRESSURE: 141 MMHG | HEIGHT: 69 IN | HEART RATE: 67 BPM | BODY MASS INDEX: 37.09 KG/M2

## 2024-03-04 DIAGNOSIS — E11.65 TYPE 2 DIABETES MELLITUS WITH HYPERGLYCEMIA, WITHOUT LONG-TERM CURRENT USE OF INSULIN: Primary | ICD-10-CM

## 2024-03-04 PROBLEM — R73.03 PREDIABETES: Status: RESOLVED | Noted: 2020-01-10 | Resolved: 2024-03-04

## 2024-03-04 LAB
ALBUMIN SERPL-MCNC: 4.5 G/DL (ref 3.5–5.2)
ALBUMIN/GLOB SERPL: 1.6 G/DL
ALP SERPL-CCNC: 66 U/L (ref 39–117)
ALT SERPL W P-5'-P-CCNC: 27 U/L (ref 1–33)
ANION GAP SERPL CALCULATED.3IONS-SCNC: 12 MMOL/L (ref 5–15)
AST SERPL-CCNC: 26 U/L (ref 1–32)
BILIRUB SERPL-MCNC: 0.2 MG/DL (ref 0–1.2)
BUN SERPL-MCNC: 14 MG/DL (ref 6–20)
BUN/CREAT SERPL: 16.1 (ref 7–25)
CALCIUM SPEC-SCNC: 9.8 MG/DL (ref 8.6–10.5)
CHLORIDE SERPL-SCNC: 104 MMOL/L (ref 98–107)
CO2 SERPL-SCNC: 26 MMOL/L (ref 22–29)
CREAT SERPL-MCNC: 0.87 MG/DL (ref 0.57–1)
EGFRCR SERPLBLD CKD-EPI 2021: 78.3 ML/MIN/1.73
GLOBULIN UR ELPH-MCNC: 2.8 GM/DL
GLUCOSE SERPL-MCNC: 103 MG/DL (ref 65–99)
HBA1C MFR BLD: 6.1 % (ref 4.8–5.6)
POTASSIUM SERPL-SCNC: 4.4 MMOL/L (ref 3.5–5.2)
PROT SERPL-MCNC: 7.3 G/DL (ref 6–8.5)
SODIUM SERPL-SCNC: 142 MMOL/L (ref 136–145)

## 2024-03-04 PROCEDURE — 83036 HEMOGLOBIN GLYCOSYLATED A1C: CPT | Performed by: FAMILY MEDICINE

## 2024-03-04 PROCEDURE — 80053 COMPREHEN METABOLIC PANEL: CPT | Performed by: FAMILY MEDICINE

## 2024-03-04 PROCEDURE — 36415 COLL VENOUS BLD VENIPUNCTURE: CPT | Performed by: FAMILY MEDICINE

## 2024-03-04 PROCEDURE — 99213 OFFICE O/P EST LOW 20 MIN: CPT | Performed by: FAMILY MEDICINE

## 2024-03-04 RX ORDER — GABAPENTIN 300 MG/1
CAPSULE ORAL
COMMUNITY

## 2024-03-04 RX ORDER — SEMAGLUTIDE 0.68 MG/ML
0.5 INJECTION, SOLUTION SUBCUTANEOUS WEEKLY
Start: 2024-03-04 | End: 2024-03-05

## 2024-03-04 NOTE — PROGRESS NOTES
"Chief Complaint  Diabetes    Subjective        Racheal Kate presents to [facility]  Diabetes  She presents for her follow-up diabetic visit. She has type 2 diabetes mellitus. Her disease course has been stable. Associated symptoms include weight loss. Pertinent negatives for diabetes include no blurred vision, no chest pain, no foot ulcerations, no polydipsia, no polyphagia and no polyuria. Symptoms are stable. She is compliant with treatment most of the time. Her weight is decreasing steadily. There is no change in her home blood glucose trend.       Objective   Vital Signs:  /84 (BP Location: Left arm, Patient Position: Sitting, Cuff Size: Adult)   Pulse 67   Temp 97.7 °F (36.5 °C) (Infrared)   Resp 16   Ht 175.3 cm (69\")   Wt 114 kg (250 lb 6.4 oz)   SpO2 97%   BMI 36.98 kg/m²   Estimated body mass index is 36.98 kg/m² as calculated from the following:    Height as of this encounter: 175.3 cm (69\").    Weight as of this encounter: 114 kg (250 lb 6.4 oz).               Physical Exam  Vitals and nursing note reviewed.   Constitutional:       General: She is not in acute distress.     Appearance: She is well-developed.   HENT:      Head: Normocephalic.   Eyes:      General: Lids are normal.      Conjunctiva/sclera: Conjunctivae normal.   Neck:      Thyroid: No thyroid mass or thyromegaly.      Trachea: Trachea normal.   Cardiovascular:      Rate and Rhythm: Normal rate and regular rhythm.      Heart sounds: Normal heart sounds.   Pulmonary:      Effort: Pulmonary effort is normal.      Breath sounds: Normal breath sounds.   Musculoskeletal:      Cervical back: Normal range of motion.      Right lower leg: No edema.      Left lower leg: No edema.   Lymphadenopathy:      Cervical: No cervical adenopathy.   Skin:     General: Skin is warm and dry.   Neurological:      Mental Status: She is alert and oriented to person, place, and time.   Psychiatric:         Attention and " Perception: She is attentive.         Mood and Affect: Mood normal.         Speech: Speech normal.         Behavior: Behavior normal.        Result Review :    The following data was reviewed by: Kelly Casas MD on 03/04/2024:  Common labs          8/2/2023    08:56   Common Labs   Glucose 160    BUN 14    Creatinine 0.75    Sodium 143    Potassium 4.6    Chloride 104    Calcium 9.8    Albumin 4.4    Total Bilirubin 0.3    Alkaline Phosphatase 71    AST (SGOT) 48    ALT (SGPT) 56    Total Cholesterol 190    Triglycerides 256    HDL Cholesterol 45    LDL Cholesterol  101    Hemoglobin A1C 7.90    Microalbumin, Urine 2.0                   Assessment and Plan     Diagnoses and all orders for this visit:    1. Type 2 diabetes mellitus with hyperglycemia, without long-term current use of insulin (Primary)  Assessment & Plan:  Diabetes is stable.   Continue current treatment regimen.  Diabetes will be reassessed in 6 months    Orders:  -     Comprehensive Metabolic Panel  -     Hemoglobin A1c    Other orders  -     Semaglutide,0.25 or 0.5MG/DOS, (Ozempic, 0.25 or 0.5 MG/DOSE,) 2 MG/3ML solution pen-injector; Inject 0.5 mg under the skin into the appropriate area as directed 1 (One) Time Per Week.             Follow Up     No follow-ups on file.  Patient was given instructions and counseling regarding her condition or for health maintenance advice. Please see specific information pulled into the AVS if appropriate.         Answers submitted by the patient for this visit:  Other (Submitted on 2/28/2024)  Please describe your symptoms.: Per Dr. Casas, she needs Fasting labs to see if medication is working or if dosage needs adjusted.  Have you had these symptoms before?: No  How long have you been having these symptoms?: 1-4 days  Please list any medications you are currently taking for this condition.: ozempic  Primary Reason for Visit (Submitted on 2/28/2024)  What is the primary reason for your visit?: Other

## 2024-03-05 RX ORDER — SEMAGLUTIDE 1.34 MG/ML
1 INJECTION, SOLUTION SUBCUTANEOUS WEEKLY
Qty: 3 ML | Refills: 2 | Status: SHIPPED | OUTPATIENT
Start: 2024-03-05

## 2024-03-09 DIAGNOSIS — E78.5 HYPERLIPIDEMIA, UNSPECIFIED HYPERLIPIDEMIA TYPE: ICD-10-CM

## 2024-03-09 RX ORDER — LOVASTATIN 20 MG/1
TABLET ORAL
Qty: 90 TABLET | Refills: 0 | Status: SHIPPED | OUTPATIENT
Start: 2024-03-09

## 2024-03-13 DIAGNOSIS — I10 ESSENTIAL HYPERTENSION: ICD-10-CM

## 2024-03-13 RX ORDER — METOPROLOL SUCCINATE 50 MG/1
TABLET, EXTENDED RELEASE ORAL
Qty: 90 TABLET | Refills: 0 | Status: SHIPPED | OUTPATIENT
Start: 2024-03-13

## 2024-03-18 RX ORDER — MELOXICAM 15 MG/1
TABLET ORAL
Qty: 90 TABLET | Refills: 0 | Status: SHIPPED | OUTPATIENT
Start: 2024-03-18

## 2024-05-20 RX ORDER — SEMAGLUTIDE 1.34 MG/ML
INJECTION, SOLUTION SUBCUTANEOUS
Qty: 3 ML | Refills: 0 | Status: SHIPPED | OUTPATIENT
Start: 2024-05-20

## 2024-06-03 DIAGNOSIS — E78.5 HYPERLIPIDEMIA, UNSPECIFIED HYPERLIPIDEMIA TYPE: ICD-10-CM

## 2024-06-03 DIAGNOSIS — E03.9 HYPOTHYROIDISM, UNSPECIFIED TYPE: ICD-10-CM

## 2024-06-03 RX ORDER — LOVASTATIN 20 MG/1
TABLET ORAL
Qty: 90 TABLET | Refills: 1 | Status: SHIPPED | OUTPATIENT
Start: 2024-06-03

## 2024-06-03 RX ORDER — LEVOTHYROXINE SODIUM 0.1 MG/1
TABLET ORAL
Qty: 90 TABLET | Refills: 1 | Status: SHIPPED | OUTPATIENT
Start: 2024-06-03

## 2024-06-10 RX ORDER — MELOXICAM 15 MG/1
TABLET ORAL
Qty: 90 TABLET | Refills: 0 | Status: SHIPPED | OUTPATIENT
Start: 2024-06-10

## 2024-06-11 DIAGNOSIS — I10 ESSENTIAL HYPERTENSION: ICD-10-CM

## 2024-06-11 RX ORDER — METOPROLOL SUCCINATE 50 MG/1
TABLET, EXTENDED RELEASE ORAL
Qty: 90 TABLET | Refills: 1 | Status: SHIPPED | OUTPATIENT
Start: 2024-06-11

## 2024-06-14 ENCOUNTER — OFFICE VISIT (OUTPATIENT)
Dept: FAMILY MEDICINE CLINIC | Facility: CLINIC | Age: 59
End: 2024-06-14
Payer: COMMERCIAL

## 2024-06-14 VITALS
OXYGEN SATURATION: 98 % | BODY MASS INDEX: 36.32 KG/M2 | SYSTOLIC BLOOD PRESSURE: 154 MMHG | DIASTOLIC BLOOD PRESSURE: 107 MMHG | TEMPERATURE: 98.1 F | HEIGHT: 69 IN | HEART RATE: 84 BPM | WEIGHT: 245.2 LBS

## 2024-06-14 DIAGNOSIS — H66.002 NON-RECURRENT ACUTE SUPPURATIVE OTITIS MEDIA OF LEFT EAR WITHOUT SPONTANEOUS RUPTURE OF TYMPANIC MEMBRANE: Primary | ICD-10-CM

## 2024-06-14 PROCEDURE — 99213 OFFICE O/P EST LOW 20 MIN: CPT | Performed by: FAMILY MEDICINE

## 2024-06-14 RX ORDER — AMOXICILLIN 500 MG/1
500 CAPSULE ORAL 3 TIMES DAILY
Qty: 30 CAPSULE | Refills: 0 | Status: SHIPPED | OUTPATIENT
Start: 2024-06-14

## 2024-06-14 RX ORDER — SEMAGLUTIDE 1.34 MG/ML
INJECTION, SOLUTION SUBCUTANEOUS
Qty: 3 ML | Refills: 0 | Status: SHIPPED | OUTPATIENT
Start: 2024-06-14

## 2024-06-14 NOTE — PROGRESS NOTES
"Chief Complaint  Earache (LT )    Subjective        Racheal Kate presents to [facility]  Earache   There is pain in the left ear. This is a new problem. The current episode started yesterday. The problem occurs constantly. The problem has been unchanged. There has been no fever. The pain is moderate. Pertinent negatives include no coughing or drainage. She has tried nothing for the symptoms.       Objective   Vital Signs:  BP (!) 154/107 (BP Location: Left arm, Patient Position: Sitting, Cuff Size: Large Adult)   Pulse 84   Temp 98.1 °F (36.7 °C) (Infrared)   Ht 175.3 cm (69\")   Wt 111 kg (245 lb 3.2 oz)   SpO2 98%   BMI 36.21 kg/m²   Estimated body mass index is 36.21 kg/m² as calculated from the following:    Height as of this encounter: 175.3 cm (69\").    Weight as of this encounter: 111 kg (245 lb 3.2 oz).               Physical Exam  Vitals and nursing note reviewed.   Constitutional:       General: She is not in acute distress.     Appearance: She is well-developed.   HENT:      Head: Normocephalic.      Left Ear: A middle ear effusion is present. Tympanic membrane is erythematous.   Eyes:      General: Lids are normal.      Conjunctiva/sclera: Conjunctivae normal.   Neck:      Thyroid: No thyroid mass or thyromegaly.      Trachea: Trachea normal.   Cardiovascular:      Rate and Rhythm: Normal rate and regular rhythm.      Heart sounds: Normal heart sounds.   Pulmonary:      Effort: Pulmonary effort is normal.      Breath sounds: Normal breath sounds.   Musculoskeletal:      Cervical back: Normal range of motion.   Lymphadenopathy:      Cervical: No cervical adenopathy.   Skin:     General: Skin is warm and dry.   Neurological:      Mental Status: She is alert and oriented to person, place, and time.   Psychiatric:         Attention and Perception: She is attentive.         Mood and Affect: Mood normal.         Speech: Speech normal.         Behavior: Behavior normal. "        Result Review :    The following data was reviewed by: Kelly Casas MD on 06/14/2024:  Common labs          8/2/2023    08:56 3/4/2024    08:19   Common Labs   Glucose 160  103    BUN 14  14    Creatinine 0.75  0.87    Sodium 143  142    Potassium 4.6  4.4    Chloride 104  104    Calcium 9.8  9.8    Albumin 4.4  4.5    Total Bilirubin 0.3  0.2    Alkaline Phosphatase 71  66    AST (SGOT) 48  26    ALT (SGPT) 56  27    Total Cholesterol 190     Triglycerides 256     HDL Cholesterol 45     LDL Cholesterol  101     Hemoglobin A1C 7.90  6.10    Microalbumin, Urine 2.0                    Assessment and Plan     Diagnoses and all orders for this visit:    1. Non-recurrent acute suppurative otitis media of left ear without spontaneous rupture of tympanic membrane (Primary)  -     amoxicillin (AMOXIL) 500 MG capsule; Take 1 capsule by mouth 3 (Three) Times a Day.  Dispense: 30 capsule; Refill: 0             Follow Up     No follow-ups on file.  Patient was given instructions and counseling regarding her condition or for health maintenance advice. Please see specific information pulled into the AVS if appropriate.

## 2024-07-08 RX ORDER — SEMAGLUTIDE 1.34 MG/ML
INJECTION, SOLUTION SUBCUTANEOUS
Qty: 3 ML | Refills: 0 | Status: SHIPPED | OUTPATIENT
Start: 2024-07-08

## 2024-08-02 RX ORDER — SEMAGLUTIDE 1.34 MG/ML
INJECTION, SOLUTION SUBCUTANEOUS
Qty: 3 ML | Refills: 0 | Status: SHIPPED | OUTPATIENT
Start: 2024-08-02

## 2024-08-07 ENCOUNTER — OFFICE VISIT (OUTPATIENT)
Dept: FAMILY MEDICINE CLINIC | Facility: CLINIC | Age: 59
End: 2024-08-07
Payer: COMMERCIAL

## 2024-08-07 ENCOUNTER — LAB (OUTPATIENT)
Dept: FAMILY MEDICINE CLINIC | Facility: CLINIC | Age: 59
End: 2024-08-07
Payer: COMMERCIAL

## 2024-08-07 VITALS
OXYGEN SATURATION: 96 % | SYSTOLIC BLOOD PRESSURE: 130 MMHG | WEIGHT: 248.4 LBS | HEART RATE: 65 BPM | BODY MASS INDEX: 36.79 KG/M2 | HEIGHT: 69 IN | DIASTOLIC BLOOD PRESSURE: 87 MMHG | TEMPERATURE: 97.8 F

## 2024-08-07 DIAGNOSIS — Z12.31 ENCOUNTER FOR SCREENING MAMMOGRAM FOR BREAST CANCER: ICD-10-CM

## 2024-08-07 DIAGNOSIS — Z00.00 WELLNESS EXAMINATION: Primary | ICD-10-CM

## 2024-08-07 DIAGNOSIS — E78.5 HYPERLIPIDEMIA, UNSPECIFIED HYPERLIPIDEMIA TYPE: ICD-10-CM

## 2024-08-07 DIAGNOSIS — E03.9 HYPOTHYROIDISM, UNSPECIFIED TYPE: ICD-10-CM

## 2024-08-07 DIAGNOSIS — E11.65 TYPE 2 DIABETES MELLITUS WITH HYPERGLYCEMIA, WITHOUT LONG-TERM CURRENT USE OF INSULIN: ICD-10-CM

## 2024-08-07 LAB
ALBUMIN SERPL-MCNC: 4.6 G/DL (ref 3.5–5.2)
ALBUMIN UR-MCNC: <1.2 MG/DL
ALBUMIN/GLOB SERPL: 1.6 G/DL
ALP SERPL-CCNC: 60 U/L (ref 39–117)
ALT SERPL W P-5'-P-CCNC: 32 U/L (ref 1–33)
ANION GAP SERPL CALCULATED.3IONS-SCNC: 12 MMOL/L (ref 5–15)
AST SERPL-CCNC: 27 U/L (ref 1–32)
BASOPHILS # BLD AUTO: 0.05 10*3/MM3 (ref 0–0.2)
BASOPHILS NFR BLD AUTO: 1 % (ref 0–1.5)
BILIRUB SERPL-MCNC: 0.5 MG/DL (ref 0–1.2)
BUN SERPL-MCNC: 15 MG/DL (ref 6–20)
BUN/CREAT SERPL: 20.8 (ref 7–25)
CALCIUM SPEC-SCNC: 10.2 MG/DL (ref 8.6–10.5)
CHLORIDE SERPL-SCNC: 99 MMOL/L (ref 98–107)
CHOLEST SERPL-MCNC: 196 MG/DL (ref 0–200)
CO2 SERPL-SCNC: 26 MMOL/L (ref 22–29)
CREAT SERPL-MCNC: 0.72 MG/DL (ref 0.57–1)
CREAT UR-MCNC: 15.4 MG/DL
DEPRECATED RDW RBC AUTO: 41 FL (ref 37–54)
EGFRCR SERPLBLD CKD-EPI 2021: 98.3 ML/MIN/1.73
EOSINOPHIL # BLD AUTO: 0.17 10*3/MM3 (ref 0–0.4)
EOSINOPHIL NFR BLD AUTO: 3.4 % (ref 0.3–6.2)
ERYTHROCYTE [DISTWIDTH] IN BLOOD BY AUTOMATED COUNT: 12.9 % (ref 12.3–15.4)
GLOBULIN UR ELPH-MCNC: 2.9 GM/DL
GLUCOSE SERPL-MCNC: 94 MG/DL (ref 65–99)
HBA1C MFR BLD: 6 % (ref 4.8–5.6)
HCT VFR BLD AUTO: 39.3 % (ref 34–46.6)
HDLC SERPL-MCNC: 50 MG/DL (ref 40–60)
HGB BLD-MCNC: 13.1 G/DL (ref 12–15.9)
IMM GRANULOCYTES # BLD AUTO: 0.02 10*3/MM3 (ref 0–0.05)
IMM GRANULOCYTES NFR BLD AUTO: 0.4 % (ref 0–0.5)
LDLC SERPL CALC-MCNC: 101 MG/DL (ref 0–100)
LDLC/HDLC SERPL: 1.85 {RATIO}
LYMPHOCYTES # BLD AUTO: 1.88 10*3/MM3 (ref 0.7–3.1)
LYMPHOCYTES NFR BLD AUTO: 37.3 % (ref 19.6–45.3)
MCH RBC QN AUTO: 29.6 PG (ref 26.6–33)
MCHC RBC AUTO-ENTMCNC: 33.3 G/DL (ref 31.5–35.7)
MCV RBC AUTO: 88.9 FL (ref 79–97)
MICROALBUMIN/CREAT UR: NORMAL MG/G{CREAT}
MONOCYTES # BLD AUTO: 0.39 10*3/MM3 (ref 0.1–0.9)
MONOCYTES NFR BLD AUTO: 7.7 % (ref 5–12)
NEUTROPHILS NFR BLD AUTO: 2.53 10*3/MM3 (ref 1.7–7)
NEUTROPHILS NFR BLD AUTO: 50.2 % (ref 42.7–76)
NRBC BLD AUTO-RTO: 0 /100 WBC (ref 0–0.2)
PLATELET # BLD AUTO: 279 10*3/MM3 (ref 140–450)
PMV BLD AUTO: 9.5 FL (ref 6–12)
POTASSIUM SERPL-SCNC: 4.6 MMOL/L (ref 3.5–5.2)
PROT SERPL-MCNC: 7.5 G/DL (ref 6–8.5)
RBC # BLD AUTO: 4.42 10*6/MM3 (ref 3.77–5.28)
SODIUM SERPL-SCNC: 137 MMOL/L (ref 136–145)
TRIGL SERPL-MCNC: 268 MG/DL (ref 0–150)
TSH SERPL DL<=0.05 MIU/L-ACNC: 0.51 UIU/ML (ref 0.27–4.2)
VLDLC SERPL-MCNC: 45 MG/DL (ref 5–40)
WBC NRBC COR # BLD AUTO: 5.04 10*3/MM3 (ref 3.4–10.8)

## 2024-08-07 PROCEDURE — 82570 ASSAY OF URINE CREATININE: CPT | Performed by: FAMILY MEDICINE

## 2024-08-07 PROCEDURE — 83036 HEMOGLOBIN GLYCOSYLATED A1C: CPT | Performed by: FAMILY MEDICINE

## 2024-08-07 PROCEDURE — 82043 UR ALBUMIN QUANTITATIVE: CPT | Performed by: FAMILY MEDICINE

## 2024-08-07 PROCEDURE — 80050 GENERAL HEALTH PANEL: CPT | Performed by: FAMILY MEDICINE

## 2024-08-07 PROCEDURE — 99396 PREV VISIT EST AGE 40-64: CPT | Performed by: FAMILY MEDICINE

## 2024-08-07 PROCEDURE — 80061 LIPID PANEL: CPT | Performed by: FAMILY MEDICINE

## 2024-08-07 PROCEDURE — 36415 COLL VENOUS BLD VENIPUNCTURE: CPT | Performed by: FAMILY MEDICINE

## 2024-08-07 RX ORDER — SEMAGLUTIDE 2.68 MG/ML
2 INJECTION, SOLUTION SUBCUTANEOUS WEEKLY
Qty: 3 ML | Refills: 2 | Status: SHIPPED | OUTPATIENT
Start: 2024-08-07 | End: 2024-08-07

## 2024-08-07 RX ORDER — SEMAGLUTIDE 2.68 MG/ML
2 INJECTION, SOLUTION SUBCUTANEOUS WEEKLY
Qty: 9 ML | Refills: 2 | Status: SHIPPED | OUTPATIENT
Start: 2024-08-07

## 2024-08-07 NOTE — PROGRESS NOTES
"Subjective   Racheal Kate is a 58 y.o. female and is here for a comprehensive physical exam. The patient reports no problems.    Do you take any herbs or supplements that were not prescribed by a doctor? no  Are you taking calcium supplements? no  Are you taking aspirin daily? no    The following portions of the patient's history were reviewed and updated as appropriate: allergies, current medications, past family history, past medical history, past social history, past surgical history, and problem list.    Review of Systems  Do you have pain that bothers you in your daily life? not asked  A comprehensive review of systems was negative.    Objective   /87 (BP Location: Right arm, Patient Position: Sitting, Cuff Size: Large Adult)   Pulse 65   Temp 97.8 °F (36.6 °C) (Infrared)   Ht 175.3 cm (69\")   Wt 113 kg (248 lb 6.4 oz)   LMP 02/01/2020 (Exact Date)   SpO2 96%   BMI 36.68 kg/m²   General appearance: alert, appears stated age, and cooperative  Head: Normocephalic, without obvious abnormality, atraumatic  Eyes: conjunctivae/corneas clear. PERRL, EOM's intact. Fundi benign.  Ears: normal TM's and external ear canals both ears  Throat: lips, mucosa, and tongue normal; teeth and gums normal  Neck: no adenopathy, no JVD, supple, symmetrical, trachea midline, and thyroid not enlarged, symmetric, no tenderness/mass/nodules  Lungs: clear to auscultation bilaterally  Heart: regular rate and rhythm, S1, S2 normal, no murmur, click, rub or gallop  Abdomen: soft, non-tender; bowel sounds normal; no masses,  no organomegaly  Extremities: extremities normal, atraumatic, no cyanosis or edema  Skin: Skin color, texture, turgor normal. No rashes or lesions  Lymph nodes: Cervical, supraclavicular, and axillary nodes normal.  Neurologic: Grossly normal     Office Visit on 08/07/2024   Component Date Value Ref Range Status    Total Cholesterol 08/07/2024 196  0 - 200 mg/dL Final    Triglycerides 08/07/2024 268 (H)  0 - " 150 mg/dL Final    HDL Cholesterol 08/07/2024 50  40 - 60 mg/dL Final    LDL Cholesterol  08/07/2024 101 (H)  0 - 100 mg/dL Final    VLDL Cholesterol 08/07/2024 45 (H)  5 - 40 mg/dL Final    LDL/HDL Ratio 08/07/2024 1.85   Final    Glucose 08/07/2024 94  65 - 99 mg/dL Final    BUN 08/07/2024 15  6 - 20 mg/dL Final    Creatinine 08/07/2024 0.72  0.57 - 1.00 mg/dL Final    Sodium 08/07/2024 137  136 - 145 mmol/L Final    Potassium 08/07/2024 4.6  3.5 - 5.2 mmol/L Final    Chloride 08/07/2024 99  98 - 107 mmol/L Final    CO2 08/07/2024 26.0  22.0 - 29.0 mmol/L Final    Calcium 08/07/2024 10.2  8.6 - 10.5 mg/dL Final    Total Protein 08/07/2024 7.5  6.0 - 8.5 g/dL Final    Albumin 08/07/2024 4.6  3.5 - 5.2 g/dL Final    ALT (SGPT) 08/07/2024 32  1 - 33 U/L Final    AST (SGOT) 08/07/2024 27  1 - 32 U/L Final    Alkaline Phosphatase 08/07/2024 60  39 - 117 U/L Final    Total Bilirubin 08/07/2024 0.5  0.0 - 1.2 mg/dL Final    Globulin 08/07/2024 2.9  gm/dL Final    A/G Ratio 08/07/2024 1.6  g/dL Final    BUN/Creatinine Ratio 08/07/2024 20.8  7.0 - 25.0 Final    Anion Gap 08/07/2024 12.0  5.0 - 15.0 mmol/L Final    eGFR 08/07/2024 98.3  >60.0 mL/min/1.73 Final    Hemoglobin A1C 08/07/2024 6.00 (H)  4.80 - 5.60 % Final    TSH 08/07/2024 0.514  0.270 - 4.200 uIU/mL Final    Microalbumin/Creatinine Ratio 08/07/2024    Final    Unable to calculate    Creatinine, Urine 08/07/2024 15.4  mg/dL Final    Microalbumin, Urine 08/07/2024 <1.2  mg/dL Final    WBC 08/07/2024 5.04  3.40 - 10.80 10*3/mm3 Final    RBC 08/07/2024 4.42  3.77 - 5.28 10*6/mm3 Final    Hemoglobin 08/07/2024 13.1  12.0 - 15.9 g/dL Final    Hematocrit 08/07/2024 39.3  34.0 - 46.6 % Final    MCV 08/07/2024 88.9  79.0 - 97.0 fL Final    MCH 08/07/2024 29.6  26.6 - 33.0 pg Final    MCHC 08/07/2024 33.3  31.5 - 35.7 g/dL Final    RDW 08/07/2024 12.9  12.3 - 15.4 % Final    RDW-SD 08/07/2024 41.0  37.0 - 54.0 fl Final    MPV 08/07/2024 9.5  6.0 - 12.0 fL Final     Platelets 08/07/2024 279  140 - 450 10*3/mm3 Final    Neutrophil % 08/07/2024 50.2  42.7 - 76.0 % Final    Lymphocyte % 08/07/2024 37.3  19.6 - 45.3 % Final    Monocyte % 08/07/2024 7.7  5.0 - 12.0 % Final    Eosinophil % 08/07/2024 3.4  0.3 - 6.2 % Final    Basophil % 08/07/2024 1.0  0.0 - 1.5 % Final    Immature Grans % 08/07/2024 0.4  0.0 - 0.5 % Final    Neutrophils, Absolute 08/07/2024 2.53  1.70 - 7.00 10*3/mm3 Final    Lymphocytes, Absolute 08/07/2024 1.88  0.70 - 3.10 10*3/mm3 Final    Monocytes, Absolute 08/07/2024 0.39  0.10 - 0.90 10*3/mm3 Final    Eosinophils, Absolute 08/07/2024 0.17  0.00 - 0.40 10*3/mm3 Final    Basophils, Absolute 08/07/2024 0.05  0.00 - 0.20 10*3/mm3 Final    Immature Grans, Absolute 08/07/2024 0.02  0.00 - 0.05 10*3/mm3 Final    nRBC 08/07/2024 0.0  0.0 - 0.2 /100 WBC Final       Assessment & Plan   Healthy female exam.   Diagnoses and all orders for this visit:    1. Wellness examination (Primary)  -     Lipid Panel  -     Comprehensive Metabolic Panel  -     Hemoglobin A1c  -     TSH  -     Microalbumin / Creatinine Urine Ratio - Urine, Clean Catch  -     CBC & Differential    2. Hypothyroidism, unspecified type  -     TSH    3. Hyperlipidemia, unspecified hyperlipidemia type  -     Lipid Panel  -     Comprehensive Metabolic Panel    4. Type 2 diabetes mellitus with hyperglycemia, without long-term current use of insulin  -     Lipid Panel  -     Comprehensive Metabolic Panel  -     Hemoglobin A1c  -     Microalbumin / Creatinine Urine Ratio - Urine, Clean Catch    5. Encounter for screening mammogram for breast cancer  -     Mammo Screening Digital Tomosynthesis Bilateral With CAD    Other orders  -     Discontinue: Semaglutide, 2 MG/DOSE, (Ozempic, 2 MG/DOSE,) 8 MG/3ML solution pen-injector; Inject 2 mg under the skin into the appropriate area as directed 1 (One) Time Per Week.  Dispense: 3 mL; Refill: 2  -     Semaglutide, 2 MG/DOSE, (Ozempic, 2 MG/DOSE,) 8 MG/3ML solution  pen-injector; Inject 2 mg under the skin into the appropriate area as directed 1 (One) Time Per Week.  Dispense: 9 mL; Refill: 2      1. Well exam.  2. Patient Counseling:  --Nutrition: Stressed importance of moderation in sodium/caffeine intake, saturated fat and cholesterol, caloric balance, sufficient intake of fresh fruits, vegetables, fiber, calcium, iron  --Exercise: Stressed the importance of regular exercise.   --Dental health: Discussed importance of regular tooth brushing, flossing, and dental visits.  --Immunizations reviewed.  --Discussed benefits of screening colonoscopy.    3. Discussed the patient's BMI with her.  The BMI is above average; BMI management plan is completed  4. Follow up in one year

## 2024-08-23 ENCOUNTER — HOSPITAL ENCOUNTER (OUTPATIENT)
Dept: MAMMOGRAPHY | Facility: HOSPITAL | Age: 59
Discharge: HOME | End: 2024-08-23
Admitting: FAMILY MEDICINE
Payer: COMMERCIAL

## 2024-08-23 PROCEDURE — 77063 BREAST TOMOSYNTHESIS BI: CPT

## 2024-08-23 PROCEDURE — 77067 SCR MAMMO BI INCL CAD: CPT

## 2024-09-04 RX ORDER — MELOXICAM 15 MG/1
TABLET ORAL
Qty: 90 TABLET | Refills: 1 | Status: SHIPPED | OUTPATIENT
Start: 2024-09-04

## 2024-12-09 DIAGNOSIS — E78.5 HYPERLIPIDEMIA, UNSPECIFIED HYPERLIPIDEMIA TYPE: ICD-10-CM

## 2024-12-09 DIAGNOSIS — I10 ESSENTIAL HYPERTENSION: ICD-10-CM

## 2024-12-09 DIAGNOSIS — E03.9 HYPOTHYROIDISM, UNSPECIFIED TYPE: ICD-10-CM

## 2024-12-09 RX ORDER — METOPROLOL SUCCINATE 50 MG/1
TABLET, EXTENDED RELEASE ORAL
Qty: 90 TABLET | Refills: 0 | Status: SHIPPED | OUTPATIENT
Start: 2024-12-09

## 2024-12-09 RX ORDER — LEVOTHYROXINE SODIUM 100 UG/1
TABLET ORAL
Qty: 90 TABLET | Refills: 0 | Status: SHIPPED | OUTPATIENT
Start: 2024-12-09

## 2024-12-09 RX ORDER — LOVASTATIN 20 MG/1
TABLET ORAL
Qty: 90 TABLET | Refills: 0 | Status: SHIPPED | OUTPATIENT
Start: 2024-12-09

## 2025-01-10 RX ORDER — SEMAGLUTIDE 2.68 MG/ML
2 INJECTION, SOLUTION SUBCUTANEOUS WEEKLY
Qty: 9 ML | Refills: 2 | Status: SHIPPED | OUTPATIENT
Start: 2025-01-10

## 2025-03-07 DIAGNOSIS — E78.5 HYPERLIPIDEMIA, UNSPECIFIED HYPERLIPIDEMIA TYPE: ICD-10-CM

## 2025-03-07 DIAGNOSIS — I10 ESSENTIAL HYPERTENSION: ICD-10-CM

## 2025-03-07 DIAGNOSIS — E03.9 HYPOTHYROIDISM, UNSPECIFIED TYPE: ICD-10-CM

## 2025-03-07 RX ORDER — MELOXICAM 15 MG/1
TABLET ORAL
Qty: 90 TABLET | Refills: 0 | Status: SHIPPED | OUTPATIENT
Start: 2025-03-07

## 2025-03-07 RX ORDER — LOVASTATIN 20 MG/1
TABLET ORAL
Qty: 90 TABLET | Refills: 0 | Status: SHIPPED | OUTPATIENT
Start: 2025-03-07

## 2025-03-07 RX ORDER — LEVOTHYROXINE SODIUM 100 UG/1
TABLET ORAL
Qty: 90 TABLET | Refills: 0 | Status: SHIPPED | OUTPATIENT
Start: 2025-03-07

## 2025-03-07 RX ORDER — METOPROLOL SUCCINATE 50 MG/1
TABLET, EXTENDED RELEASE ORAL
Qty: 90 TABLET | Refills: 0 | Status: SHIPPED | OUTPATIENT
Start: 2025-03-07

## 2025-03-07 NOTE — TELEPHONE ENCOUNTER
Rx Refill Note  Requested Prescriptions     Pending Prescriptions Disp Refills    metoprolol succinate XL (TOPROL-XL) 50 MG 24 hr tablet [Pharmacy Med Name: Metoprolol Succinate ER 50 MG Oral Tablet Extended Release 24 Hour] 90 tablet 0     Sig: Take 1 tablet by mouth once daily     Signed Prescriptions Disp Refills    meloxicam (MOBIC) 15 MG tablet 90 tablet 0     Sig: Take 1 tablet by mouth once daily     Authorizing Provider: TD LEAL     Ordering User: ZORAIDA RASMUSSEN    levothyroxine (SYNTHROID, LEVOTHROID) 100 MCG tablet 90 tablet 0     Sig: Take 1 tablet by mouth once daily     Authorizing Provider: DT LEAL User: ZORAIDA RASMUSSEN    lovastatin (MEVACOR) 20 MG tablet 90 tablet 0     Sig: TAKE 1 TABLET BY MOUTH ONCE DAILY AT BEDTIME     Authorizing Provider: TD LEAL User: ZORAIDA RASMUSSEN      Last office visit with prescribing clinician: 8/7/2024   Last telemedicine visit with prescribing clinician: Visit date not found   Next office visit with prescribing clinician: 8/8/2025                         Would you like a call back once the refill request has been completed: [] Yes [] No    If the office needs to give you a call back, can they leave a voicemail: [] Yes [] No    Zoraida Rasmussen MA  03/07/25, 08:16 EST

## 2025-06-03 DIAGNOSIS — I10 ESSENTIAL HYPERTENSION: ICD-10-CM

## 2025-06-03 DIAGNOSIS — E78.5 HYPERLIPIDEMIA, UNSPECIFIED HYPERLIPIDEMIA TYPE: ICD-10-CM

## 2025-06-03 DIAGNOSIS — E03.9 HYPOTHYROIDISM, UNSPECIFIED TYPE: ICD-10-CM

## 2025-06-03 RX ORDER — LOVASTATIN 20 MG/1
20 TABLET ORAL
Qty: 90 TABLET | Refills: 0 | Status: SHIPPED | OUTPATIENT
Start: 2025-06-03

## 2025-06-03 RX ORDER — MELOXICAM 15 MG/1
15 TABLET ORAL DAILY
Qty: 90 TABLET | Refills: 0 | Status: SHIPPED | OUTPATIENT
Start: 2025-06-03

## 2025-06-03 RX ORDER — LEVOTHYROXINE SODIUM 100 UG/1
100 TABLET ORAL DAILY
Qty: 90 TABLET | Refills: 0 | Status: SHIPPED | OUTPATIENT
Start: 2025-06-03

## 2025-06-03 RX ORDER — METOPROLOL SUCCINATE 50 MG/1
50 TABLET, EXTENDED RELEASE ORAL DAILY
Qty: 90 TABLET | Refills: 0 | Status: SHIPPED | OUTPATIENT
Start: 2025-06-03

## 2025-08-08 ENCOUNTER — LAB (OUTPATIENT)
Dept: FAMILY MEDICINE CLINIC | Facility: CLINIC | Age: 60
End: 2025-08-08
Payer: OTHER GOVERNMENT

## 2025-08-08 ENCOUNTER — OFFICE VISIT (OUTPATIENT)
Dept: FAMILY MEDICINE CLINIC | Facility: CLINIC | Age: 60
End: 2025-08-08
Payer: OTHER GOVERNMENT

## 2025-08-08 VITALS
HEIGHT: 69 IN | DIASTOLIC BLOOD PRESSURE: 87 MMHG | HEART RATE: 80 BPM | SYSTOLIC BLOOD PRESSURE: 129 MMHG | BODY MASS INDEX: 37.53 KG/M2 | WEIGHT: 253.4 LBS | TEMPERATURE: 98.3 F | OXYGEN SATURATION: 96 %

## 2025-08-08 DIAGNOSIS — E03.9 HYPOTHYROIDISM, UNSPECIFIED TYPE: ICD-10-CM

## 2025-08-08 DIAGNOSIS — Z00.00 WELLNESS EXAMINATION: Primary | ICD-10-CM

## 2025-08-08 DIAGNOSIS — Z12.11 SCREEN FOR COLON CANCER: ICD-10-CM

## 2025-08-08 DIAGNOSIS — Z23 NEED FOR PNEUMOCOCCAL VACCINE: ICD-10-CM

## 2025-08-08 DIAGNOSIS — Z12.31 ENCOUNTER FOR SCREENING MAMMOGRAM FOR BREAST CANCER: ICD-10-CM

## 2025-08-08 DIAGNOSIS — E11.65 TYPE 2 DIABETES MELLITUS WITH HYPERGLYCEMIA, WITHOUT LONG-TERM CURRENT USE OF INSULIN: ICD-10-CM

## 2025-08-08 LAB
ALBUMIN SERPL-MCNC: 4.4 G/DL (ref 3.5–5.2)
ALBUMIN UR-MCNC: 1.4 MG/DL
ALBUMIN/GLOB SERPL: 1.6 G/DL
ALP SERPL-CCNC: 57 U/L (ref 39–117)
ALT SERPL W P-5'-P-CCNC: 52 U/L (ref 1–33)
ANION GAP SERPL CALCULATED.3IONS-SCNC: 11 MMOL/L (ref 5–15)
AST SERPL-CCNC: 54 U/L (ref 1–32)
BACTERIA UR QL AUTO: ABNORMAL /HPF
BILIRUB SERPL-MCNC: 0.5 MG/DL (ref 0–1.2)
BILIRUB UR QL STRIP: NEGATIVE
BUN SERPL-MCNC: 11 MG/DL (ref 6–20)
BUN/CREAT SERPL: 13.6 (ref 7–25)
CALCIUM SPEC-SCNC: 9.6 MG/DL (ref 8.6–10.5)
CHLORIDE SERPL-SCNC: 102 MMOL/L (ref 98–107)
CHOLEST SERPL-MCNC: 185 MG/DL (ref 0–200)
CLARITY UR: CLEAR
CO2 SERPL-SCNC: 24 MMOL/L (ref 22–29)
COLOR UR: YELLOW
CREAT SERPL-MCNC: 0.81 MG/DL (ref 0.57–1)
CREAT UR-MCNC: 67.3 MG/DL
EGFRCR SERPLBLD CKD-EPI 2021: 84.8 ML/MIN/1.73
GLOBULIN UR ELPH-MCNC: 2.8 GM/DL
GLUCOSE SERPL-MCNC: 107 MG/DL (ref 65–99)
GLUCOSE UR STRIP-MCNC: NEGATIVE MG/DL
HBA1C MFR BLD: 6 % (ref 4.8–5.6)
HDLC SERPL-MCNC: 50 MG/DL (ref 40–60)
HGB UR QL STRIP.AUTO: NEGATIVE
HOLD SPECIMEN: NORMAL
HYALINE CASTS UR QL AUTO: ABNORMAL /LPF
KETONES UR QL STRIP: NEGATIVE
LDLC SERPL CALC-MCNC: 100 MG/DL (ref 0–100)
LDLC/HDLC SERPL: 1.88 {RATIO}
LEUKOCYTE ESTERASE UR QL STRIP.AUTO: ABNORMAL
MICROALBUMIN/CREAT UR: 20.8 MG/G (ref 0–29)
NITRITE UR QL STRIP: NEGATIVE
PH UR STRIP.AUTO: 6.5 [PH] (ref 5–8)
POTASSIUM SERPL-SCNC: 4.7 MMOL/L (ref 3.5–5.2)
PROT SERPL-MCNC: 7.2 G/DL (ref 6–8.5)
PROT UR QL STRIP: NEGATIVE
RBC # UR STRIP: ABNORMAL /HPF
REF LAB TEST METHOD: ABNORMAL
SODIUM SERPL-SCNC: 137 MMOL/L (ref 136–145)
SP GR UR STRIP: 1.01 (ref 1–1.03)
SQUAMOUS #/AREA URNS HPF: ABNORMAL /HPF
TRIGL SERPL-MCNC: 205 MG/DL (ref 0–150)
TSH SERPL DL<=0.05 MIU/L-ACNC: 0.24 UIU/ML (ref 0.27–4.2)
UROBILINOGEN UR QL STRIP: ABNORMAL
VLDLC SERPL-MCNC: 35 MG/DL (ref 5–40)
WBC # UR STRIP: ABNORMAL /HPF

## 2025-08-08 PROCEDURE — 80053 COMPREHEN METABOLIC PANEL: CPT | Performed by: FAMILY MEDICINE

## 2025-08-08 PROCEDURE — 36415 COLL VENOUS BLD VENIPUNCTURE: CPT | Performed by: FAMILY MEDICINE

## 2025-08-08 PROCEDURE — 82043 UR ALBUMIN QUANTITATIVE: CPT | Performed by: FAMILY MEDICINE

## 2025-08-08 PROCEDURE — 80061 LIPID PANEL: CPT | Performed by: FAMILY MEDICINE

## 2025-08-08 PROCEDURE — 83036 HEMOGLOBIN GLYCOSYLATED A1C: CPT | Performed by: FAMILY MEDICINE

## 2025-08-08 PROCEDURE — 81001 URINALYSIS AUTO W/SCOPE: CPT | Performed by: FAMILY MEDICINE

## 2025-08-08 PROCEDURE — 82570 ASSAY OF URINE CREATININE: CPT | Performed by: FAMILY MEDICINE

## 2025-08-08 PROCEDURE — 84443 ASSAY THYROID STIM HORMONE: CPT | Performed by: FAMILY MEDICINE

## 2025-08-08 RX ORDER — ASPIRIN 81 MG/1
81 TABLET ORAL DAILY
Start: 2025-08-08

## 2025-08-12 LAB
AGE GDLN ACOG TESTING: NORMAL
CYTOLOGIST CVX/VAG CYTO: NORMAL
CYTOLOGY CVX/VAG DOC CYTO: NORMAL
CYTOLOGY CVX/VAG DOC THIN PREP: NORMAL
DX ICD CODE: NORMAL
HPV GENOTYPE REFLEX: NORMAL
HPV I/H RISK 4 DNA CVX QL PROBE+SIG AMP: NEGATIVE
OTHER STN SPEC: NORMAL
SERVICE CMNT-IMP: NORMAL
STAT OF ADQ CVX/VAG CYTO-IMP: NORMAL

## 2025-08-30 DIAGNOSIS — I10 ESSENTIAL HYPERTENSION: ICD-10-CM

## 2025-08-30 DIAGNOSIS — E78.5 HYPERLIPIDEMIA, UNSPECIFIED HYPERLIPIDEMIA TYPE: ICD-10-CM

## 2025-08-30 DIAGNOSIS — E03.9 HYPOTHYROIDISM, UNSPECIFIED TYPE: ICD-10-CM

## 2025-08-30 RX ORDER — LOVASTATIN 20 MG/1
20 TABLET ORAL
Qty: 90 TABLET | Refills: 0 | Status: SHIPPED | OUTPATIENT
Start: 2025-08-30

## 2025-08-30 RX ORDER — MELOXICAM 15 MG/1
15 TABLET ORAL DAILY
Qty: 90 TABLET | Refills: 0 | Status: SHIPPED | OUTPATIENT
Start: 2025-08-30

## 2025-08-30 RX ORDER — LEVOTHYROXINE SODIUM 100 UG/1
100 TABLET ORAL DAILY
Qty: 90 TABLET | Refills: 0 | Status: SHIPPED | OUTPATIENT
Start: 2025-08-30

## 2025-08-30 RX ORDER — METOPROLOL SUCCINATE 50 MG/1
50 TABLET, EXTENDED RELEASE ORAL DAILY
Qty: 90 TABLET | Refills: 0 | Status: SHIPPED | OUTPATIENT
Start: 2025-08-30